# Patient Record
Sex: FEMALE | Race: BLACK OR AFRICAN AMERICAN | NOT HISPANIC OR LATINO | Employment: OTHER | URBAN - METROPOLITAN AREA
[De-identification: names, ages, dates, MRNs, and addresses within clinical notes are randomized per-mention and may not be internally consistent; named-entity substitution may affect disease eponyms.]

---

## 2017-01-17 ENCOUNTER — OFFICE VISIT (OUTPATIENT)
Dept: OBSTETRICS AND GYNECOLOGY | Facility: CLINIC | Age: 57
End: 2017-01-17
Payer: MEDICARE

## 2017-01-17 VITALS
DIASTOLIC BLOOD PRESSURE: 86 MMHG | SYSTOLIC BLOOD PRESSURE: 144 MMHG | BODY MASS INDEX: 32.31 KG/M2 | WEIGHT: 200.19 LBS

## 2017-01-17 DIAGNOSIS — N95.2 VAGINAL ATROPHY: Primary | ICD-10-CM

## 2017-01-17 PROCEDURE — 99213 OFFICE O/P EST LOW 20 MIN: CPT | Mod: S$GLB,,, | Performed by: OBSTETRICS & GYNECOLOGY

## 2017-01-17 PROCEDURE — 99999 PR PBB SHADOW E&M-EST. PATIENT-LVL II: CPT | Mod: PBBFAC,,, | Performed by: OBSTETRICS & GYNECOLOGY

## 2017-01-17 PROCEDURE — 87210 SMEAR WET MOUNT SALINE/INK: CPT | Mod: QW,S$GLB,, | Performed by: OBSTETRICS & GYNECOLOGY

## 2017-01-17 PROCEDURE — 1159F MED LIST DOCD IN RCRD: CPT | Mod: S$GLB,,, | Performed by: OBSTETRICS & GYNECOLOGY

## 2017-01-17 RX ORDER — ISOSORBIDE MONONITRATE 30 MG/1
30 TABLET, EXTENDED RELEASE ORAL
Refills: 11 | COMMUNITY
Start: 2017-01-10 | End: 2017-08-17

## 2017-01-17 RX ORDER — DICLOFENAC SODIUM 10 MG/G
1 GEL TOPICAL CONTINUOUS PRN
Refills: 2 | COMMUNITY
Start: 2016-12-30 | End: 2018-11-07

## 2017-01-17 RX ORDER — ESTRADIOL 0.1 MG/G
CREAM VAGINAL
Qty: 42.5 G | Refills: 2 | Status: SHIPPED | OUTPATIENT
Start: 2017-01-17 | End: 2017-04-24 | Stop reason: SDUPTHER

## 2017-01-17 RX ORDER — PANTOPRAZOLE SODIUM 20 MG/1
20 TABLET, DELAYED RELEASE ORAL DAILY PRN
Refills: 3 | COMMUNITY
Start: 2016-12-30 | End: 2018-12-28

## 2017-01-17 RX ORDER — TIZANIDINE HYDROCHLORIDE 4 MG/1
1 CAPSULE, GELATIN COATED ORAL 2 TIMES DAILY
Refills: 0 | COMMUNITY
Start: 2016-12-30 | End: 2017-08-17

## 2017-01-17 NOTE — MR AVS SNAPSHOT
[facility] - OB/ GYN  [address]  [place] [address]  Phone: [phone]  Fax: [phone]                  Ju Sinclair   2017 4:00 PM   Office Visit    Description:  Female : 1960   Provider:  Goran Real MD   Department:  [facility] - OB/ GYN           Reason for Visit     Vaginitis           Diagnoses this Visit        Comments    Vaginal atrophy    -  Primary            To Do List           Goals (5 Years of Data)     None      Follow-Up and Disposition     Return if symptoms worsen or fail to improve.    Follow-up and Disposition History       These Medications        Disp Refills Start End    estradiol (ESTRACE) 0.01 % (0.1 mg/gram) vaginal cream 42.5 g 2 2017     INSERT 1 GRAM VAGINALLY EVERY OTHER DAY FOR 1 MONTH THEN TWICE WEEKLY X 30 DAYS, THEN ONCE OR TWICE WEEKLY AS NEEDED    Pharmacy: [facility]/pharmacy #[id number] - [place], [place] - [address] Ph #: [phone]         [facility] On Call     [facility] On Call Nurse [facility] -  Assistance  Registered nurses in the [facility] provide clinical advisement, health education, appointment booking, and other advisory services.  Call for this free service at [phone].             Medications           Message regarding Medications     Verify the changes and/or additions to your medication regime listed below are the same as discussed with your clinician today.  If any of these changes or additions are incorrect, please notify your healthcare provider.        CHANGE how you are taking these medications     Start Taking Instead of    estradiol (ESTRACE) 0.01 % (0.1 mg/gram) vaginal cream ESTRACE 0.01 % (0.1 mg/gram) vaginal cream    Dosage:  INSERT 1 GRAM VAGINALLY EVERY OTHER DAY FOR 1 MONTH THEN TWICE WEEKLY X 30 DAYS, THEN ONCE OR TWICE WEEKLY AS NEEDED Dosage:  INSERT 1 GRAM VAGINALLY EVERY OTHER DAY FOR 1 MONTH THEN TWICE WEEKLY X 30 DAYS, THEN ONCE OR TWICE WEEKLY AS NEEDED    Reason for Change:  Patient  no longer taking       STOP taking these medications     NEXIUM 20 mg capsule Take 20 mg by mouth once daily. FOR ACID REFLUX    clonazePAM (KLONOPIN) 0.5 MG tablet Take 0.5 mg by mouth nightly.           Verify that the below list of medications is an accurate representation of the medications you are currently taking.  If none reported, the list may be blank. If incorrect, please contact your healthcare provider. Carry this list with you in case of emergency.           Current Medications     amlodipine-valsartan-hcthiazid -25 mg Tab once daily.     diclofenac sodium 1 % Gel 1 g continuous prn.    hydrocodone-acetaminophen 10-325mg (NORCO)  mg Tab     isosorbide mononitrate (IMDUR) 30 MG 24 hr tablet Take 30 mg by mouth every 24 hours as needed.    pantoprazole (PROTONIX) 20 MG tablet Take 20 mg by mouth daily as needed.    tizanidine 4 mg Cap Take 1 capsule by mouth 2 (two) times daily.    zolpidem (AMBIEN) 10 mg Tab Take 10 mg by mouth every evening.    amitriptyline (ELAVIL) 10 MG tablet Take 10 mg by mouth.    estradiol (ESTRACE) 0.01 % (0.1 mg/gram) vaginal cream INSERT 1 GRAM VAGINALLY EVERY OTHER DAY FOR 1 MONTH THEN TWICE WEEKLY X 30 DAYS, THEN ONCE OR TWICE WEEKLY AS NEEDED           Clinical Reference Information           Vital Signs - Last Recorded  Most recent update: 1/17/2017  4:54 PM by Zeina Ontiveros LPN    BP Wt BMI          (!) 144/86 90.8 kg (200 lb 2.8 oz) 32.31 kg/m2        Blood Pressure          Most Recent Value    BP  (!)  144/86      Allergies as of 1/17/2017     Pcn [Penicillins]    Mobic [Meloxicam]    Tramadol      Immunizations Administered on Date of Encounter - 1/17/2017     None      Orders Placed During Today's Visit      Normal Orders This Visit    POCT Wet Prep

## 2017-01-17 NOTE — PROGRESS NOTES
Subjective:       Patient ID: Ju Sinclair is a 56 y.o. female.    Chief Complaint:  Vaginitis      History of Present Illness  HPI  Pt complains of persistent vaginal burning and irritation.  Pt was previously prescribed Estrace cream for this issue and reports that her symptoms improved.  However, pt ran out of medication and symptoms recurred.  Pt requests refill of this medication.  Last pap NILM in 2016.    GYN & OB History  No LMP recorded. Patient is postmenopausal.   Date of Last Pap: 2016    OB History    Para Term  AB SAB TAB Ectopic Multiple Living   2 2 2             # Outcome Date GA Lbr Vinicio/2nd Weight Sex Delivery Anes PTL Lv   2 Term      CS-Unspec      1 Term      CS-Unspec             Review of Systems  Review of Systems   Constitutional: Negative for activity change, appetite change, fatigue, fever and unexpected weight change.   Respiratory: Negative for cough, shortness of breath and wheezing.    Cardiovascular: Negative for chest pain.   Gastrointestinal: Negative for abdominal pain, constipation, diarrhea, nausea and vomiting.   Genitourinary: Positive for vaginal pain. Negative for dysuria, genital sores, hematuria, pelvic pain, vaginal bleeding, vaginal discharge and vaginal odor.   Musculoskeletal: Negative for back pain.   Neurological: Negative for syncope and headaches.           Objective:    Physical Exam:   Constitutional: She is oriented to person, place, and time. She appears well-developed and well-nourished. No distress.       Cardiovascular: Normal rate and regular rhythm.     Pulmonary/Chest: Effort normal.        Abdominal: Soft. Bowel sounds are normal. She exhibits no distension. There is no tenderness.     Genitourinary: Vagina normal and uterus normal. Pelvic exam was performed with patient supine. There is no rash, tenderness, lesion or injury on the right labia. There is no rash, tenderness, lesion or injury on the left labia. Uterus is not  deviated, not enlarged and not tender. Cervix is normal. Right adnexum displays no mass, no tenderness and no fullness. Left adnexum displays no mass, no tenderness and no fullness. No erythema, tenderness or bleeding in the vagina. No foreign body in the vagina. No signs of injury around the vagina. No vaginal discharge found. Cervix exhibits no motion tenderness, no discharge and no friability.   Genitourinary Comments: Pale, dry mucosa with absent ruggae consistent with vaginal atrophy  Wet prep: negative for clue cells, yeast, or trichomonas           Musculoskeletal: Normal range of motion and moves all extremeties. She exhibits no edema or tenderness.       Neurological: She is alert and oriented to person, place, and time.    Skin: Skin is warm and dry.    Psychiatric: She has a normal mood and affect. Her behavior is normal. Thought content normal.          Assessment:        1. Vaginal atrophy             Plan:      Vaginal atrophy  -     estradiol (ESTRACE) 0.01 % (0.1 mg/gram) vaginal cream; INSERT 1 GRAM VAGINALLY EVERY OTHER DAY FOR 1 MONTH THEN TWICE WEEKLY X 30 DAYS, THEN ONCE OR TWICE WEEKLY  AS NEEDED  Dispense: 42.5 g; Refill: 2  -     POCT Wet Prep  -     No evidence of infection and evidence of vaginal atrophy on exam today.  OK to refill estrogen cream and otc lubricant use prn.  Pt voiced understanding.       Return if symptoms worsen or fail to improve.

## 2017-04-13 ENCOUNTER — TELEPHONE (OUTPATIENT)
Dept: OBSTETRICS AND GYNECOLOGY | Facility: CLINIC | Age: 57
End: 2017-04-13

## 2017-04-13 DIAGNOSIS — Z12.31 ENCOUNTER FOR SCREENING MAMMOGRAM FOR BREAST CANCER: Primary | ICD-10-CM

## 2017-04-13 NOTE — TELEPHONE ENCOUNTER
----- Message from Belkys Frey MA sent at 4/13/2017  1:56 PM CDT -----      ----- Message -----     From: Ana Gallegos     Sent: 4/13/2017   1:40 PM       To: Perfecto FERRIS Staff    pls input 6mth mammo orders to [facility] 4/24..[phone]

## 2017-04-24 ENCOUNTER — OFFICE VISIT (OUTPATIENT)
Dept: OBSTETRICS AND GYNECOLOGY | Facility: CLINIC | Age: 57
End: 2017-04-24
Payer: MEDICARE

## 2017-04-24 ENCOUNTER — HOSPITAL ENCOUNTER (OUTPATIENT)
Dept: RADIOLOGY | Facility: HOSPITAL | Age: 57
Discharge: HOME | End: 2017-04-24
Attending: NURSE PRACTITIONER
Payer: MEDICARE

## 2017-04-24 VITALS
HEIGHT: 66 IN | SYSTOLIC BLOOD PRESSURE: 124 MMHG | BODY MASS INDEX: 32.47 KG/M2 | WEIGHT: 202 LBS | DIASTOLIC BLOOD PRESSURE: 70 MMHG

## 2017-04-24 DIAGNOSIS — Z12.31 ENCOUNTER FOR SCREENING MAMMOGRAM FOR BREAST CANCER: ICD-10-CM

## 2017-04-24 DIAGNOSIS — B96.89 BV (BACTERIAL VAGINOSIS): ICD-10-CM

## 2017-04-24 DIAGNOSIS — N76.0 BV (BACTERIAL VAGINOSIS): ICD-10-CM

## 2017-04-24 DIAGNOSIS — Z11.3 SCREENING FOR STD (SEXUALLY TRANSMITTED DISEASE): ICD-10-CM

## 2017-04-24 DIAGNOSIS — N89.8 VAGINAL ODOR: Primary | ICD-10-CM

## 2017-04-24 DIAGNOSIS — N95.2 VAGINAL ATROPHY: ICD-10-CM

## 2017-04-24 PROCEDURE — 99214 OFFICE O/P EST MOD 30 MIN: CPT | Mod: S$GLB,,, | Performed by: NURSE PRACTITIONER

## 2017-04-24 PROCEDURE — 77063 BREAST TOMOSYNTHESIS BI: CPT | Mod: 26,,, | Performed by: RADIOLOGY

## 2017-04-24 PROCEDURE — 77067 SCR MAMMO BI INCL CAD: CPT | Mod: 26,,, | Performed by: RADIOLOGY

## 2017-04-24 PROCEDURE — 99999 PR PBB SHADOW E&M-EST. PATIENT-LVL III: CPT | Mod: PBBFAC,,, | Performed by: NURSE PRACTITIONER

## 2017-04-24 PROCEDURE — 1160F RVW MEDS BY RX/DR IN RCRD: CPT | Mod: S$GLB,,, | Performed by: NURSE PRACTITIONER

## 2017-04-24 PROCEDURE — 77067 SCR MAMMO BI INCL CAD: CPT | Mod: TC

## 2017-04-24 RX ORDER — CELECOXIB 100 MG/1
CAPSULE ORAL
Refills: 1 | COMMUNITY
Start: 2017-04-04 | End: 2018-12-28

## 2017-04-24 RX ORDER — METOPROLOL SUCCINATE 25 MG/1
25 TABLET, EXTENDED RELEASE ORAL DAILY
Refills: 11 | COMMUNITY
Start: 2017-04-12 | End: 2019-03-19 | Stop reason: SDUPTHER

## 2017-04-24 RX ORDER — NITROGLYCERIN 0.3 MG/1
TABLET SUBLINGUAL
Refills: 11 | COMMUNITY
Start: 2017-04-14 | End: 2017-08-17

## 2017-04-24 RX ORDER — ESTRADIOL 0.1 MG/G
CREAM VAGINAL
Qty: 42.5 G | Refills: 2 | Status: SHIPPED | OUTPATIENT
Start: 2017-04-24 | End: 2018-11-07

## 2017-04-24 RX ORDER — METRONIDAZOLE 500 MG/1
500 TABLET ORAL EVERY 12 HOURS
Qty: 14 TABLET | Refills: 0 | Status: SHIPPED | OUTPATIENT
Start: 2017-04-24 | End: 2017-05-01

## 2017-04-24 NOTE — PROGRESS NOTES
"Ju Sinclair is a 57 y.o. female  presents with complaint of vaginal discharge for few weeks.  She denies itching.  reports odor.  She states the discharge is yellow.    Wants blood work std testing done.   Wants refill on her estrace.     Past Medical History:   Diagnosis Date    Anxiety disorder     Breast disorder     Hypertension     Mental disorder     Neuropathy     Sleep apnea      Past Surgical History:   Procedure Laterality Date     SECTION      SHOULDER SURGERY      TONSILLECTOMY       Social History   Substance Use Topics    Smoking status: Never Smoker    Smokeless tobacco: Never Used    Alcohol use No     Family History   Problem Relation Age of Onset    Eclampsia Paternal Grandmother     Diabetes Paternal Grandmother     Breast cancer Sister     Colon cancer Maternal Grandfather     Ovarian cancer Neg Hx     Cancer Neg Hx     Hypertension Neg Hx     Miscarriages / Stillbirths Neg Hx      labor Neg Hx     Stroke Neg Hx      OB History    Para Term  AB SAB TAB Ectopic Multiple Living   2 2 2       2      # Outcome Date GA Lbr Vinicio/2nd Weight Sex Delivery Anes PTL Lv   2 Term      CS-Unspec      1 Term      CS-Unspec             /70  Ht 5' 6" (1.676 m)  Wt 91.6 kg (202 lb)  BMI 32.6 kg/m2    ROS:  GENERAL: No fever, chills, fatigability or weight loss.  VULVAR: No pain, no lesions and no itching.  VAGINAL: No relaxation, no itching, no discharge, no abnormal bleeding and no lesions.  ABDOMEN: No abdominal pain. Denies nausea. Denies vomiting. No diarrhea. No constipation  BREAST: Denies pain. No lumps. No discharge.  URINARY: No incontinence, no nocturia, no frequency and no dysuria.  CARDIOVASCULAR: No chest pain. No shortness of breath. No leg cramps.  NEUROLOGICAL: No headaches. No vision changes.    PHYSICAL EXAM:  VULVA: normal appearing vulva with no masses, tenderness or lesions, VAGINA: atrophic, vaginal discharge - creamy, " CERVIX: normal appearing cervix without discharge or lesions, UTERUS: uterus is normal size, shape, consistency and nontender, ADNEXA: normal adnexa in size, nontender and no masses, WET MOUNT done - results: clue cells, excessive bacteria    ASSESSMENT and PLAN:  1. Vaginal odor  POCT Wet Prep   2. BV (bacterial vaginosis)  metronidazole (FLAGYL) 500 MG tablet   3. Screening for STD (sexually transmitted disease)  HIV-1 and HIV-2 antibodies    Hepatitis panel, acute    RPR   4. Vaginal atrophy  estradiol (ESTRACE) 0.01 % (0.1 mg/gram) vaginal cream       Patient was counseled today on vaginitis prevention including :  a. avoiding feminine products such as deoderant soaps, body wash, bubble bath, douches, scented toilet paper, deoderant tampons or pads, feminine wipes, chronic pad use, etc.  b. avoiding other vulvovaginal irritants such as long hot baths, humidity, tight, synthetic clothing, chlorine and sitting around in wet bathing suits  c. wearing cotton underwear, avoiding thong underwear and no underwear to bed  d. taking showers instead of baths and use a hair dryer on cool setting afterwards to dry  e. wearing cotton to exercise and shower immediately after exercise and change clothes  f. using polyurethane condoms without spermicide if sexually active and symptoms are triggered by intercourse

## 2017-07-20 ENCOUNTER — TELEPHONE (OUTPATIENT)
Dept: UROLOGY | Facility: CLINIC | Age: 57
End: 2017-07-20

## 2017-07-20 NOTE — TELEPHONE ENCOUNTER
----- Message from Belkys Boothe sent at 7/20/2017  9:45 AM CDT -----  NP- Requesting an appt on today for bladder issues. Patient was advised of availability. Please adv/call [phone].//thanks. cw

## 2017-08-17 ENCOUNTER — OFFICE VISIT (OUTPATIENT)
Dept: INTERNAL MEDICINE | Facility: CLINIC | Age: 57
End: 2017-08-17
Payer: MEDICARE

## 2017-08-17 ENCOUNTER — HOSPITAL ENCOUNTER (OUTPATIENT)
Dept: RADIOLOGY | Facility: HOSPITAL | Age: 57
Discharge: HOME | End: 2017-08-17
Attending: NURSE PRACTITIONER
Payer: MEDICARE

## 2017-08-17 VITALS
HEART RATE: 101 BPM | DIASTOLIC BLOOD PRESSURE: 82 MMHG | BODY MASS INDEX: 32.88 KG/M2 | WEIGHT: 204.56 LBS | SYSTOLIC BLOOD PRESSURE: 124 MMHG | TEMPERATURE: 97 F | HEIGHT: 66 IN

## 2017-08-17 DIAGNOSIS — M54.2 NECK PAIN: ICD-10-CM
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
## 2017-08-17 DIAGNOSIS — M25.552 PAIN OF LEFT HIP JOINT: ICD-10-CM

## 2017-08-17 DIAGNOSIS — M54.50 ACUTE BILATERAL LOW BACK PAIN WITHOUT SCIATICA: ICD-10-CM
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
## 2017-08-17 PROCEDURE — 71020 XR CHEST PA AND LATERAL: CPT | Mod: 26,,, | Performed by: RADIOLOGY

## 2017-08-17 PROCEDURE — 96372 THER/PROPH/DIAG INJ SC/IM: CPT | Mod: S$GLB,,, | Performed by: NURSE PRACTITIONER

## 2017-08-17 PROCEDURE — 99999 PR PBB SHADOW E&M-EST. PATIENT-LVL V: CPT | Mod: PBBFAC,,, | Performed by: NURSE PRACTITIONER

## 2017-08-17 PROCEDURE — 73521 X-RAY EXAM HIPS BI 2 VIEWS: CPT | Mod: TC,PO

## 2017-08-17 PROCEDURE — 72100 X-RAY EXAM L-S SPINE 2/3 VWS: CPT | Mod: TC,PO

## 2017-08-17 PROCEDURE — 99214 OFFICE O/P EST MOD 30 MIN: CPT | Mod: 25,S$GLB,, | Performed by: NURSE PRACTITIONER

## 2017-08-17 PROCEDURE — 72040 X-RAY EXAM NECK SPINE 2-3 VW: CPT | Mod: TC,PO

## 2017-08-17 PROCEDURE — 72040 X-RAY EXAM NECK SPINE 2-3 VW: CPT | Mod: 26,,, | Performed by: RADIOLOGY

## 2017-08-17 PROCEDURE — 71020 XR CHEST PA AND LATERAL: CPT | Mod: TC,PO

## 2017-08-17 PROCEDURE — 72100 X-RAY EXAM L-S SPINE 2/3 VWS: CPT | Mod: 26,,, | Performed by: RADIOLOGY

## 2017-08-17 PROCEDURE — 73521 X-RAY EXAM HIPS BI 2 VIEWS: CPT | Mod: 26,,, | Performed by: RADIOLOGY

## 2017-08-17 RX ORDER — NAPROXEN 500 MG/1
500 TABLET ORAL 2 TIMES DAILY WITH MEALS
Qty: 14 TABLET | Refills: 0 | Status: SHIPPED | OUTPATIENT
Start: 2017-08-17 | End: 2017-08-24

## 2017-08-17 RX ORDER — TIZANIDINE 2 MG/1
2 TABLET ORAL EVERY 6 HOURS PRN
Qty: 10 TABLET | Refills: 0 | Status: SHIPPED | OUTPATIENT
Start: 2017-08-17 | End: 2017-08-27

## 2017-08-17 RX ORDER — KETOROLAC TROMETHAMINE 30 MG/ML
30 INJECTION, SOLUTION INTRAMUSCULAR; INTRAVENOUS
Status: COMPLETED | OUTPATIENT
Start: 2017-08-17 | End: 2017-08-17

## 2017-08-17 RX ADMIN — KETOROLAC TROMETHAMINE 30 MG: 30 INJECTION, SOLUTION INTRAMUSCULAR; INTRAVENOUS at 01:08

## 2017-08-17 NOTE — PROGRESS NOTES
Subjective:       Patient ID: Ju Sinclair is a 57 y.o. female.    Chief Complaint: Fall (fell last night and today has total body ache and pain)    Fall   The accident occurred 6 to 12 hours ago. Fall occurred: while getting prayed for at [employer]. She fell from a height of 6 to 10 ft (she fell straight backward from standing height). She landed on concrete. There was no blood loss. The point of impact was the buttocks, left hip and neck. The pain is present in the buttocks, back, left hip and neck. The pain is at a severity of 8/10. The pain is moderate. The symptoms are aggravated by ambulation, movement, pressure on injury and rotation. Associated symptoms include headaches (mild ). Pertinent negatives include no abdominal pain, bowel incontinence, fever, hearing loss, hematuria, loss of consciousness, nausea, numbness, tingling, visual change or vomiting. She has tried nothing for the symptoms. The treatment provided no relief.     Review of Systems   Constitutional: Negative for activity change, appetite change, chills, diaphoresis, fatigue, fever and unexpected weight change.   Gastrointestinal: Negative for abdominal pain, bowel incontinence, nausea and vomiting.   Genitourinary: Negative for hematuria.   Musculoskeletal: Positive for arthralgias and myalgias.   Neurological: Positive for headaches (mild ). Negative for tingling, loss of consciousness and numbness.       Objective:      Physical Exam   Constitutional: She is oriented to person, place, and time.   Cardiovascular: Normal rate and regular rhythm.    Pulmonary/Chest: Effort normal and breath sounds normal.   Abdominal: Soft. Bowel sounds are normal.   Musculoskeletal:        Left hip: She exhibits decreased range of motion, decreased strength, tenderness and bony tenderness. She exhibits no swelling, no crepitus, no deformity and no laceration.        Thoracic back: She exhibits spasm.        Lumbar back: She exhibits decreased range of  motion, tenderness, pain and spasm.   Neurological: She is alert and oriented to person, place, and time.   Skin: Skin is warm and dry.   Psychiatric: She has a normal mood and affect.       Assessment:       1. Fall, initial encounter    2. Neck pain    3. Pain of left hip joint    4. Acute bilateral low back pain without sciatica        Plan:   Fall, initial encounter  -     X-Ray Lumbar Spine Ap And Lateral; Future; Expected date: 08/17/2017  -     X-Ray Hips Bilateral 2 View Inc AP Pelvis; Future; Expected date: 08/17/2017  -     X-Ray Cervical Spine AP And Lateral; Future; Expected date: 08/17/2017  -     X-Ray Chest PA And Lateral; Future; Expected date: 08/17/2017  -     tizanidine (ZANAFLEX) 2 MG tablet; Take 1 tablet (2 mg total) by mouth every 6 (six) hours as needed.  Dispense: 10 tablet; Refill: 0  -     ketorolac injection 30 mg; Inject 30 mg into the muscle one time.  -     naproxen (NAPROSYN) 500 MG tablet; Take 1 tablet (500 mg total) by mouth 2 (two) times daily with meals.  Dispense: 14 tablet; Refill: 0    Neck pain  -     X-Ray Cervical Spine AP And Lateral; Future; Expected date: 08/17/2017  -     tizanidine (ZANAFLEX) 2 MG tablet; Take 1 tablet (2 mg total) by mouth every 6 (six) hours as needed.  Dispense: 10 tablet; Refill: 0  -     ketorolac injection 30 mg; Inject 30 mg into the muscle one time.  -     naproxen (NAPROSYN) 500 MG tablet; Take 1 tablet (500 mg total) by mouth 2 (two) times daily with meals.  Dispense: 14 tablet; Refill: 0    Pain of left hip joint  -     X-Ray Hips Bilateral 2 View Inc AP Pelvis; Future; Expected date: 08/17/2017  -     tizanidine (ZANAFLEX) 2 MG tablet; Take 1 tablet (2 mg total) by mouth every 6 (six) hours as needed.  Dispense: 10 tablet; Refill: 0  -     ketorolac injection 30 mg; Inject 30 mg into the muscle one time.  -     naproxen (NAPROSYN) 500 MG tablet; Take 1 tablet (500 mg total) by mouth 2 (two) times daily with meals.  Dispense: 14 tablet;  Refill: 0    Acute bilateral low back pain without sciatica  -     X-Ray Lumbar Spine Ap And Lateral; Future; Expected date: 08/17/2017  -     tizanidine (ZANAFLEX) 2 MG tablet; Take 1 tablet (2 mg total) by mouth every 6 (six) hours as needed.  Dispense: 10 tablet; Refill: 0  -     ketorolac injection 30 mg; Inject 30 mg into the muscle one time.  -     naproxen (NAPROSYN) 500 MG tablet; Take 1 tablet (500 mg total) by mouth 2 (two) times daily with meals.  Dispense: 14 tablet; Refill: 0      As above  xrays with review to follow  meds as above   bengay massage/ diclofenac gel  Warm epsom salt bath  Gentle stretching  Heating pad   ER for acute changes/worsening of symptoms or new onset of acute symptoms- (confusion, cp, sob, blurred vision, slurred speech)  D/c celebrex for now

## 2018-04-13 ENCOUNTER — TELEPHONE (OUTPATIENT)
Dept: OBSTETRICS AND GYNECOLOGY | Facility: CLINIC | Age: 58
End: 2018-04-13

## 2018-04-13 DIAGNOSIS — Z12.31 ENCOUNTER FOR SCREENING MAMMOGRAM FOR BREAST CANCER: Primary | ICD-10-CM

## 2018-04-13 NOTE — TELEPHONE ENCOUNTER
----- Message from Yris Torres sent at 4/13/2018  9:59 AM CDT -----  Contact: Pt  Pt called and stated she needed to speak to the nurse. She stated she needs an order for a mammo. She can be reached at [phone].    Thanks,  TF

## 2018-04-13 NOTE — TELEPHONE ENCOUNTER
----- Message from Yris Torres sent at 4/13/2018  9:59 AM CDT -----  Contact: Pt  Pt called and stated she needed to speak to the nurse. She stated she needs an order for a mammo. She can be reached at [phone].    Thanks,  TF

## 2018-04-13 NOTE — TELEPHONE ENCOUNTER
----- Message from Yris Torres sent at 4/13/2018  9:59 AM CDT -----  Contact: Pt  Pt called and stated she needed to speak to the nurse. She stated she needs an order for a mammo. She can be reached at [phone].    Thanks,  TF

## 2018-04-23 ENCOUNTER — OFFICE VISIT (OUTPATIENT)
Dept: INTERNAL MEDICINE | Facility: CLINIC | Age: 58
End: 2018-04-23
Payer: MEDICARE

## 2018-04-23 ENCOUNTER — HOSPITAL ENCOUNTER (OUTPATIENT)
Dept: RADIOLOGY | Facility: HOSPITAL | Age: 58
Discharge: HOME | End: 2018-04-23
Attending: NURSE PRACTITIONER
Payer: MEDICARE

## 2018-04-23 VITALS
TEMPERATURE: 98 F | DIASTOLIC BLOOD PRESSURE: 80 MMHG | WEIGHT: 222.88 LBS | SYSTOLIC BLOOD PRESSURE: 122 MMHG | BODY MASS INDEX: 35.82 KG/M2 | HEART RATE: 90 BPM | HEIGHT: 66 IN | OXYGEN SATURATION: 97 %

## 2018-04-23 DIAGNOSIS — J06.9 ACUTE URI: ICD-10-CM

## 2018-04-23 DIAGNOSIS — R22.9 SINGLE SKIN NODULE: ICD-10-CM

## 2018-04-23 DIAGNOSIS — J06.9 ACUTE URI: Primary | ICD-10-CM

## 2018-04-23 PROCEDURE — 99214 OFFICE O/P EST MOD 30 MIN: CPT | Mod: S$GLB,,, | Performed by: NURSE PRACTITIONER

## 2018-04-23 PROCEDURE — 71046 X-RAY EXAM CHEST 2 VIEWS: CPT | Mod: TC,FY,PO

## 2018-04-23 PROCEDURE — 71046 X-RAY EXAM CHEST 2 VIEWS: CPT | Mod: 26,,, | Performed by: RADIOLOGY

## 2018-04-23 PROCEDURE — 99999 PR PBB SHADOW E&M-EST. PATIENT-LVL IV: CPT | Mod: PBBFAC,,, | Performed by: NURSE PRACTITIONER

## 2018-04-23 RX ORDER — GUAIFENESIN 600 MG/1
600 TABLET, EXTENDED RELEASE ORAL 2 TIMES DAILY
Qty: 20 TABLET | Refills: 0 | Status: SHIPPED | OUTPATIENT
Start: 2018-04-23 | End: 2018-05-03

## 2018-04-23 RX ORDER — FLUTICASONE PROPIONATE 50 MCG
2 SPRAY, SUSPENSION (ML) NASAL DAILY
Qty: 16 G | Refills: 0 | Status: SHIPPED | OUTPATIENT
Start: 2018-04-23 | End: 2018-12-28

## 2018-04-23 RX ORDER — LOSARTAN POTASSIUM AND HYDROCHLOROTHIAZIDE 25; 100 MG/1; MG/1
TABLET ORAL
Refills: 0 | COMMUNITY
Start: 2018-03-27 | End: 2018-12-28

## 2018-04-23 RX ORDER — METHYLPREDNISOLONE 4 MG/1
TABLET ORAL
Qty: 1 PACKAGE | Refills: 0 | Status: SHIPPED | OUTPATIENT
Start: 2018-04-23 | End: 2018-12-28

## 2018-04-23 RX ORDER — VENLAFAXINE HYDROCHLORIDE 150 MG/1
CAPSULE, EXTENDED RELEASE ORAL
Refills: 1 | COMMUNITY
Start: 2018-03-27 | End: 2019-03-19

## 2018-04-23 RX ORDER — AMLODIPINE BESYLATE 5 MG/1
5 TABLET ORAL DAILY
Refills: 0 | COMMUNITY
Start: 2018-03-27 | End: 2018-11-07

## 2018-04-23 NOTE — PROGRESS NOTES
Subjective:       Patient ID: Ju Sinclair is a 58 y.o. female.    Chief Complaint: Chest Congestion; Nasal Congestion; Cough; and Mass (bump on rt thigh and painful to touch, noticed few days ago)    URI    This is a new problem. The current episode started in the past 7 days. The problem has been waxing and waning. There has been no fever. Associated symptoms include congestion, coughing, rhinorrhea and wheezing. Pertinent negatives include no abdominal pain, chest pain, diarrhea, dysuria, ear pain, headaches, joint pain, joint swelling, nausea, neck pain, plugged ear sensation, rash, sinus pain, sneezing, sore throat, swollen glands or vomiting. She has tried antihistamine for the symptoms. The treatment provided mild relief.     Review of Systems   Constitutional: Negative for activity change, appetite change, chills, diaphoresis, fatigue, fever and unexpected weight change.   HENT: Positive for congestion, postnasal drip and rhinorrhea. Negative for ear pain, sinus pain, sinus pressure, sneezing, sore throat, tinnitus, trouble swallowing and voice change.    Eyes: Negative for photophobia, pain and visual disturbance.   Respiratory: Positive for cough and wheezing. Negative for chest tightness and shortness of breath.    Cardiovascular: Negative for chest pain, palpitations and leg swelling.   Gastrointestinal: Negative for abdominal distention, abdominal pain, blood in stool, constipation, diarrhea, nausea and vomiting.   Genitourinary: Negative for dysuria and frequency.   Musculoskeletal: Negative for arthralgias, back pain, joint pain, joint swelling, neck pain and neck stiffness.   Skin: Negative for rash.        Small knot to right thigh   Neurological: Negative for dizziness, tremors, seizures, syncope, facial asymmetry, speech difficulty, weakness, light-headedness, numbness and headaches.   Psychiatric/Behavioral: Negative for confusion and sleep disturbance.       Objective:      Physical Exam    HENT:   Right Ear: Tympanic membrane normal.   Left Ear: Tympanic membrane normal.   Nose: Mucosal edema present.   Mouth/Throat: Uvula is midline, oropharynx is clear and moist and mucous membranes are normal.   Neck: Normal range of motion. Neck supple.   Cardiovascular: Normal rate and regular rhythm.    Pulmonary/Chest: Effort normal. She has wheezes (cleared with cough ).   Musculoskeletal: Normal range of motion.   Neurological: She is alert.   Skin: Skin is warm and dry.        Psychiatric: She has a normal mood and affect.       Assessment:       1. Acute URI    2. Single skin nodule        Plan:   Acute URI  -     X-Ray Chest PA And Lateral; Future; Expected date: 04/23/2018  -     methylPREDNISolone (MEDROL DOSEPACK) 4 mg tablet; use as directed  Dispense: 1 Package; Refill: 0  -     fluticasone (FLONASE) 50 mcg/actuation nasal spray; 2 sprays (100 mcg total) by Each Nare route once daily.  Dispense: 16 g; Refill: 0    Single skin nodule  Comments:  right lateral upper thigh     Other orders  -     guaiFENesin (MUCINEX) 600 mg 12 hr tablet; Take 1 tablet (600 mg total) by mouth 2 (two) times daily.  Dispense: 20 tablet; Refill: 0      As above  Increase water intake  Apply warm compress to right upper thigh 15-20 mins twice daily  Start steroid pack today - call if symptoms fail to improve by Friday

## 2018-04-23 NOTE — MEDICAL/APP STUDENT
Subjective:       Patient ID: Ju Sinclair is a 58 y.o. female.    Chief Complaint: Chest Congestion; Nasal Congestion; Cough; and Mass (bump on rt thigh and painful to touch, noticed few days ago)    Pt comes in complaining of cold for over a week. She states she has noticed some wheezing and that the symptoms have not gotten any better. Pt states she is coughing up yellow- greenish mucous. Denies fever, SOB, vomiting, or diarrhea. Pt also complains of a knot to her R thigh that she noticed a few days ago.       Review of Systems    Objective:      Physical Exam    Assessment:       No diagnosis found.    Plan:

## 2018-04-23 NOTE — PATIENT INSTRUCTIONS
Apply warm compress to right upper thigh 15-20 mins twice daily  Start steroid pack today - call if symptoms fail to improve by Friday

## 2018-05-04 ENCOUNTER — HOSPITAL ENCOUNTER (OUTPATIENT)
Dept: RADIOLOGY | Facility: HOSPITAL | Age: 58
Discharge: HOME | End: 2018-05-04
Attending: NURSE PRACTITIONER
Payer: MEDICARE

## 2018-05-04 ENCOUNTER — OFFICE VISIT (OUTPATIENT)
Dept: OBSTETRICS AND GYNECOLOGY | Facility: CLINIC | Age: 58
End: 2018-05-04
Payer: MEDICARE

## 2018-05-04 VITALS
HEIGHT: 66 IN | DIASTOLIC BLOOD PRESSURE: 82 MMHG | WEIGHT: 222 LBS | BODY MASS INDEX: 35.68 KG/M2 | SYSTOLIC BLOOD PRESSURE: 130 MMHG

## 2018-05-04 DIAGNOSIS — R10.2 PELVIC PAIN IN FEMALE: ICD-10-CM

## 2018-05-04 DIAGNOSIS — R31.9 URINARY TRACT INFECTION WITH HEMATURIA, SITE UNSPECIFIED: ICD-10-CM

## 2018-05-04 DIAGNOSIS — Z12.31 ENCOUNTER FOR SCREENING MAMMOGRAM FOR BREAST CANCER: ICD-10-CM

## 2018-05-04 DIAGNOSIS — N39.0 URINARY TRACT INFECTION WITH HEMATURIA, SITE UNSPECIFIED: ICD-10-CM

## 2018-05-04 DIAGNOSIS — Z01.419 ENCOUNTER FOR GYNECOLOGICAL EXAMINATION WITHOUT ABNORMAL FINDING: Primary | ICD-10-CM

## 2018-05-04 DIAGNOSIS — R35.0 URINE FREQUENCY: ICD-10-CM

## 2018-05-04 DIAGNOSIS — N85.2 ENLARGED UTERUS: ICD-10-CM

## 2018-05-04 LAB
BILIRUB UR QL STRIP: NEGATIVE
CLARITY UR: CLEAR
COLOR UR: YELLOW
GLUCOSE UR QL STRIP: NEGATIVE
HGB UR QL STRIP: ABNORMAL
KETONES UR QL STRIP: ABNORMAL
LEUKOCYTE ESTERASE UR QL STRIP: NEGATIVE
MICROSCOPIC COMMENT: NORMAL
NITRITE UR QL STRIP: NEGATIVE
PH UR STRIP: 6 [PH] (ref 5–8)
PROT UR QL STRIP: NEGATIVE
RBC #/AREA URNS HPF: 4 /HPF (ref 0–4)
SP GR UR STRIP: 1.01 (ref 1–1.03)
URN SPEC COLLECT METH UR: ABNORMAL

## 2018-05-04 PROCEDURE — 77063 BREAST TOMOSYNTHESIS BI: CPT | Mod: 26,,, | Performed by: RADIOLOGY

## 2018-05-04 PROCEDURE — G0101 CA SCREEN;PELVIC/BREAST EXAM: HCPCS | Mod: S$GLB,,, | Performed by: NURSE PRACTITIONER

## 2018-05-04 PROCEDURE — 77067 SCR MAMMO BI INCL CAD: CPT | Mod: TC,PO

## 2018-05-04 PROCEDURE — 81002 URINALYSIS NONAUTO W/O SCOPE: CPT | Mod: S$GLB,,, | Performed by: NURSE PRACTITIONER

## 2018-05-04 PROCEDURE — 77067 SCR MAMMO BI INCL CAD: CPT | Mod: 26,,, | Performed by: RADIOLOGY

## 2018-05-04 PROCEDURE — 99999 PR PBB SHADOW E&M-EST. PATIENT-LVL III: CPT | Mod: PBBFAC,,, | Performed by: NURSE PRACTITIONER

## 2018-05-04 PROCEDURE — 81000 URINALYSIS NONAUTO W/SCOPE: CPT | Mod: PO

## 2018-05-04 PROCEDURE — 87086 URINE CULTURE/COLONY COUNT: CPT

## 2018-05-04 RX ORDER — OXYCODONE AND ACETAMINOPHEN 7.5; 325 MG/1; MG/1
TABLET ORAL
COMMUNITY
End: 2018-05-04

## 2018-05-04 RX ORDER — OLMESARTAN MEDOXOMIL AND HYDROCHLOROTHIAZIDE 40/25 40; 25 MG/1; MG/1
1 TABLET ORAL DAILY
Refills: 1 | COMMUNITY
Start: 2018-02-26 | End: 2018-12-28

## 2018-05-04 RX ORDER — ESTRADIOL 0.1 MG/G
CREAM VAGINAL
COMMUNITY
Start: 2015-10-20 | End: 2018-05-04 | Stop reason: SDUPTHER

## 2018-05-04 RX ORDER — VENLAFAXINE HYDROCHLORIDE 75 MG/1
CAPSULE, EXTENDED RELEASE ORAL
Refills: 1 | COMMUNITY
Start: 2018-01-28 | End: 2018-05-04

## 2018-05-04 RX ORDER — NITROFURANTOIN 25; 75 MG/1; MG/1
100 CAPSULE ORAL 2 TIMES DAILY
Qty: 14 CAPSULE | Refills: 0 | Status: SHIPPED | OUTPATIENT
Start: 2018-05-04 | End: 2018-05-11

## 2018-05-04 RX ORDER — MIRTAZAPINE 15 MG/1
TABLET, FILM COATED ORAL
COMMUNITY
Start: 2018-04-21 | End: 2018-12-28

## 2018-05-04 NOTE — PROGRESS NOTES
"CC: Well woman exam    Ju Sinclair is a 58 y.o. female  presents for well woman exam.  LMP: No LMP recorded. Patient is postmenopausal..    Having some urine frequency that started   MMG today after visit.     Past Medical History:   Diagnosis Date    Anxiety disorder     Breast disorder     Hypertension     Mental disorder     Neuropathy     Sleep apnea      Past Surgical History:   Procedure Laterality Date     SECTION      SHOULDER SURGERY      TONSILLECTOMY       Social History     Social History    Marital status:      Spouse name: N/A    Number of children: N/A    Years of education: N/A     Occupational History    Not on file.     Social History Main Topics    Smoking status: Never Smoker    Smokeless tobacco: Never Used    Alcohol use No    Drug use: No    Sexual activity: No     Other Topics Concern    Not on file     Social History Narrative    No narrative on file     Family History   Problem Relation Age of Onset    Eclampsia Paternal Grandmother     Diabetes Paternal Grandmother     Breast cancer Sister     Colon cancer Maternal Grandfather     Ovarian cancer Neg Hx     Cancer Neg Hx     Hypertension Neg Hx     Miscarriages / Stillbirths Neg Hx      labor Neg Hx     Stroke Neg Hx      OB History      Para Term  AB Living    2 2 2     2    SAB TAB Ectopic Multiple Live Births                       /82   Ht 5' 6" (1.676 m)   Wt 100.7 kg (222 lb 0.1 oz)   BMI 35.83 kg/m²       ROS:  GENERAL: Denies weight gain or weight loss. Feeling well overall.   SKIN: Denies rash or lesions.   HEAD: Denies head injury or headache.   NODES: Denies enlarged lymph nodes.   CHEST: Denies chest pain or shortness of breath.   CARDIOVASCULAR: Denies palpitations or left sided chest pain.   ABDOMEN: No abdominal pain, constipation, diarrhea, nausea, vomiting or rectal bleeding.   URINARY: No frequency, dysuria, hematuria, or burning on " urination.  REPRODUCTIVE: See HPI.   BREASTS: The patient performs breast self-examination and denies pain, lumps, or nipple discharge.   HEMATOLOGIC: No easy bruisability or excessive bleeding.   MUSCULOSKELETAL: Denies joint pain or swelling.   NEUROLOGIC: Denies syncope or weakness.   PSYCHIATRIC: Denies depression, anxiety or mood swings.    PHYSICAL EXAM:  APPEARANCE: Well nourished, well developed, in no acute distress.  AFFECT: WNL, alert and oriented x 3  SKIN: No acne or hirsutism  NECK: Neck symmetric without masses or thyromegaly  NODES: No inguinal, cervical, axillary, or femoral lymph node enlargement  CHEST: Good respiratory effect  ABDOMEN: Soft.  No tenderness or masses.  No hepatosplenomegaly.  No hernias.  BREASTS: Symmetrical, no skin changes or visible lesions.  No palpable masses, nipple discharge bilaterally.  PELVIC: Normal external genitalia without lesions.  Normal hair distribution.  Adequate perineal body, normal urethral meatus.  Vagina moist and well rugated without lesions or discharge.  Cervix pink, without lesions, discharge or tenderness.  No significant cystocele or rectocele.  Bimanual exam shows uterus to be enlarged 10-12 week  size, regular, mobile and tender.  Adnexa without masses or tenderness.    EXTREMITIES: No edema.  Physical Exam    1. Encounter for gynecological examination without abnormal finding     2. Urine frequency  POCT urine dipstick without microscope    Urinalysis    Urine culture   3. Urinary tract infection with hematuria, site unspecified  POCT urine dipstick without microscope    Urinalysis    Urine culture    AND PLAN:    Patient was counseled today on A.C.S. Pap guidelines and recommendations for yearly pelvic exams, mammograms and monthly self breast exams; to see her PCP for other health maintenance.     ua dip with large blood and trace protein  Check urine and culture through lab

## 2018-05-05 LAB — BACTERIA UR CULT: NO GROWTH

## 2018-05-11 ENCOUNTER — TELEPHONE (OUTPATIENT)
Dept: RADIOLOGY | Facility: HOSPITAL | Age: 58
End: 2018-05-11

## 2018-05-14 ENCOUNTER — HOSPITAL ENCOUNTER (OUTPATIENT)
Dept: RADIOLOGY | Facility: HOSPITAL | Age: 58
Discharge: HOME | End: 2018-05-14
Attending: NURSE PRACTITIONER
Payer: MEDICARE

## 2018-05-14 ENCOUNTER — OFFICE VISIT (OUTPATIENT)
Dept: OPHTHALMOLOGY | Facility: CLINIC | Age: 58
End: 2018-05-14
Payer: MEDICARE

## 2018-05-14 DIAGNOSIS — H43.812 POSTERIOR VITREOUS DETACHMENT OF LEFT EYE: Primary | ICD-10-CM

## 2018-05-14 DIAGNOSIS — H52.4 MYOPIA WITH PRESBYOPIA OF BOTH EYES: ICD-10-CM

## 2018-05-14 DIAGNOSIS — H43.392 VITREOUS FLOATERS OF LEFT EYE: ICD-10-CM

## 2018-05-14 DIAGNOSIS — R10.2 PELVIC PAIN IN FEMALE: ICD-10-CM

## 2018-05-14 DIAGNOSIS — M79.7 FIBROMYALGIA: ICD-10-CM

## 2018-05-14 DIAGNOSIS — H04.123 DRY EYES, BILATERAL: ICD-10-CM

## 2018-05-14 DIAGNOSIS — H52.13 MYOPIA WITH PRESBYOPIA OF BOTH EYES: ICD-10-CM

## 2018-05-14 DIAGNOSIS — I10 HYPERTENSION, UNSPECIFIED TYPE: ICD-10-CM

## 2018-05-14 DIAGNOSIS — N85.2 ENLARGED UTERUS: ICD-10-CM

## 2018-05-14 PROBLEM — E78.5 HYPERLIPIDEMIA: Status: ACTIVE | Noted: 2018-05-14

## 2018-05-14 PROBLEM — F32.A DEPRESSION: Status: ACTIVE | Noted: 2018-05-14

## 2018-05-14 PROBLEM — F41.9 ANXIETY: Status: ACTIVE | Noted: 2018-05-14

## 2018-05-14 PROCEDURE — 92015 DETERMINE REFRACTIVE STATE: CPT | Mod: S$GLB,,, | Performed by: OPTOMETRIST

## 2018-05-14 PROCEDURE — 99999 PR PBB SHADOW E&M-EST. PATIENT-LVL I: CPT | Mod: PBBFAC,,, | Performed by: OPTOMETRIST

## 2018-05-14 PROCEDURE — 76830 TRANSVAGINAL US NON-OB: CPT | Mod: 26,,, | Performed by: RADIOLOGY

## 2018-05-14 PROCEDURE — 92004 COMPRE OPH EXAM NEW PT 1/>: CPT | Mod: S$GLB,,, | Performed by: OPTOMETRIST

## 2018-05-14 PROCEDURE — 76856 US EXAM PELVIC COMPLETE: CPT | Mod: 26,,, | Performed by: RADIOLOGY

## 2018-05-14 PROCEDURE — 76830 TRANSVAGINAL US NON-OB: CPT | Mod: TC,PO

## 2018-05-14 NOTE — PROGRESS NOTES
HPI     Hypertensive Eye Exam    Additional comments: Yearly           Comments     NP to [facility].   Pt has noticed some changes in vision since last eye exam  Wears PAL glasses full-time last updated 3 years ago  C/O floaters in both eyes described as little black spots moving in   central vision.  Floaters have been present in the right eye 2 years left eye 6-7 months.  No other complaints  No drops         Last edited by Keren Sanchez, OD on 5/14/2018  3:22 PM. (History)            Assessment /Plan     For exam results, see Encounter Report.    Posterior vitreous detachment of left eye    Vitreous floaters of left eye    Hypertension, unspecified type    Fibromyalgia    Dry eyes, bilateral    Myopia with presbyopia of both eyes      PVD with vitreous floater left eye without retinal tear or detachment.  Return to clinic with any increase in floaters, flashes or curtain effect.  No hypertensive retinopathy in either eye.  Artificial tears as needed..  Glaucoma screening and fundus exam normal.  Updated glasses prescription.  Return to clinic 1 yr.

## 2018-05-22 ENCOUNTER — TELEPHONE (OUTPATIENT)
Dept: OBSTETRICS AND GYNECOLOGY | Facility: CLINIC | Age: 58
End: 2018-05-22

## 2018-05-22 DIAGNOSIS — N39.0 URINARY TRACT INFECTION WITH HEMATURIA, SITE UNSPECIFIED: ICD-10-CM

## 2018-05-22 DIAGNOSIS — R35.0 URINE FREQUENCY: Primary | ICD-10-CM

## 2018-05-22 DIAGNOSIS — R31.9 URINARY TRACT INFECTION WITH HEMATURIA, SITE UNSPECIFIED: ICD-10-CM

## 2018-05-22 NOTE — TELEPHONE ENCOUNTER
Attempted to put in referral and this urologist is not coming up - try to get exact spelling of name of urologist - nothing is coming up   This person could be out of her network???

## 2018-05-22 NOTE — TELEPHONE ENCOUNTER
Spoke with pt and informed her of the recommendation below. Pt stated she believe she was seen at that facility once before and refused to pay the $1300 bill that she received. I told pt to call [facility] just to make sure and then give us a call back. Pt verbalized understanding.

## 2018-05-22 NOTE — TELEPHONE ENCOUNTER
----- Message from Krystina Dickson sent at 5/22/2018 11:00 AM CDT -----  Pt is requesting a call from nurse to get a referral for a urologist. Please fax referral to Dr Elizabeth Collins office at [phone].          Please call pt back at [phone]

## 2018-11-07 ENCOUNTER — OFFICE VISIT (OUTPATIENT)
Dept: OBSTETRICS AND GYNECOLOGY | Facility: CLINIC | Age: 58
End: 2018-11-07
Payer: MEDICARE

## 2018-11-07 VITALS
WEIGHT: 226.63 LBS | DIASTOLIC BLOOD PRESSURE: 82 MMHG | BODY MASS INDEX: 36.42 KG/M2 | SYSTOLIC BLOOD PRESSURE: 114 MMHG | HEIGHT: 66 IN

## 2018-11-07 DIAGNOSIS — R10.2 PELVIC PAIN IN FEMALE: Primary | ICD-10-CM

## 2018-11-07 DIAGNOSIS — N95.2 VAGINAL ATROPHY: ICD-10-CM

## 2018-11-07 DIAGNOSIS — R10.32 LEFT INGUINAL PAIN: ICD-10-CM

## 2018-11-07 DIAGNOSIS — R31.9 HEMATURIA, UNSPECIFIED TYPE: ICD-10-CM

## 2018-11-07 LAB
BILIRUB UR QL STRIP: NEGATIVE
CLARITY UR: CLEAR
COLOR UR: YELLOW
GLUCOSE UR QL STRIP: NEGATIVE
HGB UR QL STRIP: ABNORMAL
KETONES UR QL STRIP: NEGATIVE
LEUKOCYTE ESTERASE UR QL STRIP: NEGATIVE
MICROSCOPIC COMMENT: NORMAL
NITRITE UR QL STRIP: NEGATIVE
PH UR STRIP: 6 [PH] (ref 5–8)
PROT UR QL STRIP: NEGATIVE
RBC #/AREA URNS HPF: 2 /HPF (ref 0–4)
SP GR UR STRIP: <=1.005 (ref 1–1.03)
URN SPEC COLLECT METH UR: ABNORMAL
WBC #/AREA URNS HPF: 1 /HPF (ref 0–5)

## 2018-11-07 PROCEDURE — 81002 URINALYSIS NONAUTO W/O SCOPE: CPT | Mod: S$GLB,,, | Performed by: NURSE PRACTITIONER

## 2018-11-07 PROCEDURE — 99214 OFFICE O/P EST MOD 30 MIN: CPT | Mod: 25,S$GLB,, | Performed by: NURSE PRACTITIONER

## 2018-11-07 PROCEDURE — 3008F BODY MASS INDEX DOCD: CPT | Mod: CPTII,S$GLB,, | Performed by: NURSE PRACTITIONER

## 2018-11-07 PROCEDURE — 99999 PR PBB SHADOW E&M-EST. PATIENT-LVL III: CPT | Mod: PBBFAC,,, | Performed by: NURSE PRACTITIONER

## 2018-11-07 PROCEDURE — 81000 URINALYSIS NONAUTO W/SCOPE: CPT | Mod: PO

## 2018-11-07 PROCEDURE — 87210 SMEAR WET MOUNT SALINE/INK: CPT | Mod: QW,S$GLB,, | Performed by: NURSE PRACTITIONER

## 2018-11-07 PROCEDURE — 3079F DIAST BP 80-89 MM HG: CPT | Mod: CPTII,S$GLB,, | Performed by: NURSE PRACTITIONER

## 2018-11-07 PROCEDURE — 3074F SYST BP LT 130 MM HG: CPT | Mod: CPTII,S$GLB,, | Performed by: NURSE PRACTITIONER

## 2018-11-07 PROCEDURE — 87086 URINE CULTURE/COLONY COUNT: CPT

## 2018-11-07 RX ORDER — GABAPENTIN 300 MG/1
300 CAPSULE ORAL 3 TIMES DAILY
Refills: 1 | COMMUNITY
Start: 2018-11-04 | End: 2018-12-28

## 2018-11-07 RX ORDER — TRAMADOL HYDROCHLORIDE 50 MG/1
50 TABLET ORAL EVERY 12 HOURS PRN
Refills: 0 | COMMUNITY
Start: 2018-11-04 | End: 2018-12-28

## 2018-11-07 NOTE — PROGRESS NOTES
CC:  Pelvic pain    Ju Sinclair is a 58 y.o. female  presents for persistant pain for the last 2 week(s) mainly to left groin area.  Has discharge and feels has uti.    UA dip with blood and protein, same as in May with negative culture.  Was instructed to see urology pt states she did and was told was just going to have blood in urine.  Pelvic u/s done in May showed small fibroid.   Off her estrace cream.      No LMP recorded. Patient is postmenopausal.    Past Medical History:   Diagnosis Date    Anxiety disorder     Breast disorder     Hyperlipidemia 2018    Hypertension     Mental disorder     Neuropathy     Sleep apnea      Past Surgical History:   Procedure Laterality Date     SECTION      SHOULDER SURGERY      TONSILLECTOMY       Family History   Problem Relation Age of Onset    Eclampsia Paternal Grandmother     Diabetes Paternal Grandmother     Breast cancer Sister     Colon cancer Maternal Grandfather     Ovarian cancer Neg Hx     Cancer Neg Hx     Hypertension Neg Hx     Miscarriages / Stillbirths Neg Hx      labor Neg Hx     Stroke Neg Hx      Social History     Socioeconomic History    Marital status:      Spouse name: Not on file    Number of children: Not on file    Years of education: Not on file    Highest education level: Not on file   Social Needs    Financial resource strain: Not on file    Food insecurity - worry: Not on file    Food insecurity - inability: Not on file    Transportation needs - medical: Not on file    Transportation needs - non-medical: Not on file   Occupational History    Not on file   Tobacco Use    Smoking status: Never Smoker    Smokeless tobacco: Never Used   Substance and Sexual Activity    Alcohol use: No     Alcohol/week: 0.0 oz    Drug use: No    Sexual activity: Yes     Partners: Male     Birth control/protection: None   Other Topics Concern    Not on file   Social History Narrative    Not on  "file     OB History    Para Term  AB Living   2 2 2     2   SAB TAB Ectopic Multiple Live Births           2      # Outcome Date GA Lbr Vinicio/2nd Weight Sex Delivery Anes PTL Lv   2 Term      CS-Unspec      1 Term      CS-Unspec             /82   Ht 5' 6" (1.676 m)   Wt 102.8 kg (226 lb 10.1 oz)   BMI 36.58 kg/m²         ROS:  GENERAL: Denies weight gain or weight loss. Feeling well overall.   SKIN: Denies rash or lesions.   HEAD: Denies head injury or headache.   NODES: Denies enlarged lymph nodes.   CHEST: Denies chest pain or shortness of breath.   CARDIOVASCULAR: Denies palpitations or left sided chest pain.   ABDOMEN: No abdominal pain, constipation, diarrhea, nausea, vomiting or rectal bleeding.   URINARY: No frequency, dysuria, hematuria, or burning on urination.  REPRODUCTIVE: See HPI.   BREASTS: The patient performs breast self-examination and denies pain, lumps, or nipple discharge.   HEMATOLOGIC: No easy bruisability or excessive bleeding.   MUSCULOSKELETAL: Denies joint pain or swelling.   NEUROLOGIC: Denies syncope or weakness.   PSYCHIATRIC: Denies depression, anxiety or mood swings.    PHYSICAL EXAM:  APPEARANCE: Well nourished, well developed, in no acute distress.  AFFECT: WNL, alert and oriented x 3  SKIN: No acne or hirsutism    PELVIC: Normal external genitalia without lesions.  Normal hair distribution.  Adequate perineal body, normal urethral meatus.  Vagina moist and well rugated without lesions or discharge.  Cervix pink, without lesions, discharge or tenderness.  No significant cystocele or rectocele.  Bimanual exam shows uterus to be 8-10 week size, regular, mobile and nontender.  Adnexa without masses or tenderness.  Pain with ROM noted to pelvis and hips.     EXTREMITIES: No edema.    AND PLAN:    Ju was seen today for urinary tract infection.    Diagnoses and all orders for this visit:    Pelvic pain in female  -     POCT Wet Prep  -     POCT urine dipstick " without microscope  -     Urinalysis  -     Urine culture  -     US Pelvis Comp with Transvag NON-OB (xpd; Future    Left inguinal pain  -     POCT Wet Prep  -     POCT urine dipstick without microscope  -     Urinalysis  -     Urine culture  -     US Pelvis Comp with Transvag NON-OB (xpd; Future    Hematuria, unspecified type  -     POCT urine dipstick without microscope  -     Urinalysis  -     Urine culture  -     US Pelvis Comp with Transvag NON-OB (xpd; Future    Vaginal atrophy  -     prasterone, dhea, (INTRAROSA) 6.5 mg Inst; Place 6.5 mg vaginally every evening.           did not recommend repeating us at this time but pt wants to recheck  Recommend she see her pcp for the pain in left groin  Start intrarosa  Wet prep was negative   If pain were to [place]ors[place] to ER for workup

## 2018-11-08 ENCOUNTER — HOSPITAL ENCOUNTER (OUTPATIENT)
Dept: RADIOLOGY | Facility: HOSPITAL | Age: 58
Discharge: HOME | End: 2018-11-08
Attending: NURSE PRACTITIONER
Payer: MEDICARE

## 2018-11-08 DIAGNOSIS — R10.2 PELVIC PAIN IN FEMALE: ICD-10-CM

## 2018-11-08 DIAGNOSIS — R31.9 HEMATURIA, UNSPECIFIED TYPE: ICD-10-CM

## 2018-11-08 DIAGNOSIS — R10.32 LEFT INGUINAL PAIN: ICD-10-CM

## 2018-11-09 ENCOUNTER — HOSPITAL ENCOUNTER (OUTPATIENT)
Dept: RADIOLOGY | Facility: HOSPITAL | Age: 58
Discharge: HOME | End: 2018-11-09
Attending: NURSE PRACTITIONER
Payer: MEDICARE

## 2018-11-09 PROCEDURE — 76856 US EXAM PELVIC COMPLETE: CPT | Mod: 26,,, | Performed by: RADIOLOGY

## 2018-11-09 PROCEDURE — 76830 TRANSVAGINAL US NON-OB: CPT | Mod: 26,,, | Performed by: RADIOLOGY

## 2018-11-09 PROCEDURE — 76856 US EXAM PELVIC COMPLETE: CPT | Mod: TC,PO

## 2018-11-09 PROCEDURE — 76830 TRANSVAGINAL US NON-OB: CPT | Mod: TC,PO

## 2018-11-10 LAB — BACTERIA UR CULT: NORMAL

## 2018-11-12 ENCOUNTER — PATIENT MESSAGE (OUTPATIENT)
Dept: OBSTETRICS AND GYNECOLOGY | Facility: CLINIC | Age: 58
End: 2018-11-12

## 2018-11-13 ENCOUNTER — TELEPHONE (OUTPATIENT)
Dept: PHARMACY | Facility: CLINIC | Age: 58
End: 2018-11-13

## 2018-11-13 NOTE — TELEPHONE ENCOUNTER
Reason for call:    Notify patient PA submitted for Intrarosa 6.5mg tablets to [employer] @ 4:26pm on 11/13/18.    Will notify patient and provider upon approval / denial.    Thanks,   Keren Castro CPhT, B.A  Patient Care Advocate   [facility]  Phone: [phone] Ext [phone]  Fax: [phone]

## 2018-11-14 ENCOUNTER — TELEPHONE (OUTPATIENT)
Dept: PHARMACY | Facility: CLINIC | Age: 58
End: 2018-11-14

## 2018-11-14 NOTE — TELEPHONE ENCOUNTER
Called pt to notify her that we had to complete her PA on her medication intrarosa, pt stated that she had received a call from them already stated that she had been approved...chandni.

## 2018-11-14 NOTE — TELEPHONE ENCOUNTER
Let pt know her intrarosa was covered and will cost minimal out of pocket, she can go to pharmacy at [facility] to  - see note below

## 2018-11-14 NOTE — TELEPHONE ENCOUNTER
Let pt know PA was put in for her intrarosa, once we hear acceptance or denial she will be notified

## 2018-11-28 ENCOUNTER — OFFICE VISIT (OUTPATIENT)
Dept: OBSTETRICS AND GYNECOLOGY | Facility: CLINIC | Age: 58
End: 2018-11-28
Payer: MEDICARE

## 2018-11-28 VITALS
DIASTOLIC BLOOD PRESSURE: 86 MMHG | SYSTOLIC BLOOD PRESSURE: 156 MMHG | BODY MASS INDEX: 36.14 KG/M2 | HEIGHT: 66 IN | WEIGHT: 224.88 LBS

## 2018-11-28 DIAGNOSIS — M79.605 LEFT LEG PAIN: ICD-10-CM

## 2018-11-28 DIAGNOSIS — M94.9 DISORDER OF CARTILAGE: ICD-10-CM

## 2018-11-28 DIAGNOSIS — R60.0 LEG EDEMA, LEFT: ICD-10-CM

## 2018-11-28 DIAGNOSIS — M79.605 LEG PAIN, POSTERIOR, LEFT: ICD-10-CM

## 2018-11-28 DIAGNOSIS — D25.9 UTERINE LEIOMYOMA, UNSPECIFIED LOCATION: Primary | ICD-10-CM

## 2018-11-28 DIAGNOSIS — R60.0 LEG EDEMA, LEFT: Primary | ICD-10-CM

## 2018-11-28 DIAGNOSIS — Z13.820 OSTEOPOROSIS SCREENING: ICD-10-CM

## 2018-11-28 PROCEDURE — 3077F SYST BP >= 140 MM HG: CPT | Mod: CPTII,S$GLB,, | Performed by: OBSTETRICS & GYNECOLOGY

## 2018-11-28 PROCEDURE — 99213 OFFICE O/P EST LOW 20 MIN: CPT | Mod: S$GLB,,, | Performed by: OBSTETRICS & GYNECOLOGY

## 2018-11-28 PROCEDURE — 3079F DIAST BP 80-89 MM HG: CPT | Mod: CPTII,S$GLB,, | Performed by: OBSTETRICS & GYNECOLOGY

## 2018-11-28 PROCEDURE — 3008F BODY MASS INDEX DOCD: CPT | Mod: CPTII,S$GLB,, | Performed by: OBSTETRICS & GYNECOLOGY

## 2018-11-28 PROCEDURE — 99999 PR PBB SHADOW E&M-EST. PATIENT-LVL IV: CPT | Mod: PBBFAC,,, | Performed by: OBSTETRICS & GYNECOLOGY

## 2018-11-28 NOTE — PROGRESS NOTES
Subjective:       Patient ID: Ju Sinclair is a 58 y.o. female.    Chief Complaint:  Follow-up      History of Present Illness  HPI  here for f/u pelvic ultrasound done for pain. pt also c/o lower extremity edema & pain    GYN & OB History  No LMP recorded. Patient is postmenopausal.   Date of Last Pap: 2016 U/S:  FINDINGS:  The uterus measures 9.0 cm in length. There is a 9 mm fibroid noted within the posterior body of the uterus.  The myometrium is diffusely heterogeneous.  The endometrial stripe in this premenopausal patient measures 2.7 mm which is within normal limits.  The right ovary measures 2.0 x 1.1 x 0.9 cm.  The left ovary measures 2.3 x 1.0 x 1.4 cm .   Arterial flow demonstrated to both ovaries.  No free fluid identified.    OB History    Para Term  AB Living   2 2 2     2   SAB TAB Ectopic Multiple Live Births           2      # Outcome Date GA Lbr Vinicio/2nd Weight Sex Delivery Anes PTL Lv   2 Term 91    M CS-Unspec   LAURA   1 Term 80    M CS-Unspec   LAURA          Review of Systems  Review of Systems   All other systems reviewed and are negative.      Objective:     Physical Exam   Constitutional: She is oriented to person, place, and time. She appears well-developed and well-nourished.   Pulmonary/Chest: Effort normal.   Musculoskeletal: Normal range of motion. She exhibits edema.   Edema symmetrical 2+ but pt reports pain more on left   Neurological: She is alert and oriented to person, place, and time.   Skin: Skin is warm and dry.   Psychiatric: She has a normal mood and affect. Her behavior is normal.        Assessment:        1. Uterine leiomyoma, unspecified location    2. Lower extremity edema & pain          Plan:      1. Benign/reassuring pelvic u/s results discussed  2. Left lower extremity doppler studies

## 2018-11-29 ENCOUNTER — APPOINTMENT (OUTPATIENT)
Dept: RADIOLOGY | Facility: HOSPITAL | Age: 58
End: 2018-11-29
Attending: OBSTETRICS & GYNECOLOGY
Payer: MEDICARE

## 2018-11-29 DIAGNOSIS — M94.9 DISORDER OF CARTILAGE: ICD-10-CM

## 2018-11-29 DIAGNOSIS — Z13.820 OSTEOPOROSIS SCREENING: ICD-10-CM

## 2018-11-29 PROCEDURE — 77080 DXA BONE DENSITY AXIAL: CPT | Mod: TC,PO

## 2018-11-29 PROCEDURE — 77080 DXA BONE DENSITY AXIAL: CPT | Mod: 26,,, | Performed by: RADIOLOGY

## 2018-11-30 ENCOUNTER — HOSPITAL ENCOUNTER (OUTPATIENT)
Dept: RADIOLOGY | Facility: HOSPITAL | Age: 58
Discharge: HOME | End: 2018-11-30
Attending: OBSTETRICS & GYNECOLOGY
Payer: MEDICARE

## 2018-11-30 DIAGNOSIS — M79.605 LEFT LEG PAIN: ICD-10-CM

## 2018-11-30 DIAGNOSIS — R60.0 LEG EDEMA, LEFT: ICD-10-CM

## 2018-11-30 PROCEDURE — 93971 EXTREMITY STUDY: CPT | Mod: TC,PO

## 2018-11-30 PROCEDURE — 93971 EXTREMITY STUDY: CPT | Mod: 26,,, | Performed by: RADIOLOGY

## 2019-03-19 ENCOUNTER — OFFICE VISIT (OUTPATIENT)
Dept: INTERNAL MEDICINE | Facility: CLINIC | Age: 59
End: 2019-03-19
Payer: MEDICARE

## 2019-03-19 VITALS
BODY MASS INDEX: 35.29 KG/M2 | HEIGHT: 66 IN | HEART RATE: 79 BPM | OXYGEN SATURATION: 99 % | DIASTOLIC BLOOD PRESSURE: 86 MMHG | WEIGHT: 219.56 LBS | TEMPERATURE: 98 F | SYSTOLIC BLOOD PRESSURE: 140 MMHG

## 2019-03-19 DIAGNOSIS — F32.A DEPRESSION, UNSPECIFIED DEPRESSION TYPE: Chronic | ICD-10-CM

## 2019-03-19 DIAGNOSIS — F41.9 ANXIETY: ICD-10-CM

## 2019-03-19 DIAGNOSIS — I10 ESSENTIAL HYPERTENSION: Primary | ICD-10-CM

## 2019-03-19 PROCEDURE — 3008F PR BODY MASS INDEX (BMI) DOCUMENTED: ICD-10-PCS | Mod: CPTII,S$GLB,, | Performed by: FAMILY MEDICINE

## 2019-03-19 PROCEDURE — 99999 PR PBB SHADOW E&M-EST. PATIENT-LVL III: CPT | Mod: PBBFAC,,, | Performed by: FAMILY MEDICINE

## 2019-03-19 PROCEDURE — 3079F DIAST BP 80-89 MM HG: CPT | Mod: CPTII,S$GLB,, | Performed by: FAMILY MEDICINE

## 2019-03-19 PROCEDURE — 3077F SYST BP >= 140 MM HG: CPT | Mod: CPTII,S$GLB,, | Performed by: FAMILY MEDICINE

## 2019-03-19 PROCEDURE — 3077F PR MOST RECENT SYSTOLIC BLOOD PRESSURE >= 140 MM HG: ICD-10-PCS | Mod: CPTII,S$GLB,, | Performed by: FAMILY MEDICINE

## 2019-03-19 PROCEDURE — 99999 PR PBB SHADOW E&M-EST. PATIENT-LVL III: ICD-10-PCS | Mod: PBBFAC,,, | Performed by: FAMILY MEDICINE

## 2019-03-19 PROCEDURE — 99214 PR OFFICE/OUTPT VISIT, EST, LEVL IV, 30-39 MIN: ICD-10-PCS | Mod: S$GLB,,, | Performed by: FAMILY MEDICINE

## 2019-03-19 PROCEDURE — 3008F BODY MASS INDEX DOCD: CPT | Mod: CPTII,S$GLB,, | Performed by: FAMILY MEDICINE

## 2019-03-19 PROCEDURE — 99214 OFFICE O/P EST MOD 30 MIN: CPT | Mod: S$GLB,,, | Performed by: FAMILY MEDICINE

## 2019-03-19 PROCEDURE — 3079F PR MOST RECENT DIASTOLIC BLOOD PRESSURE 80-89 MM HG: ICD-10-PCS | Mod: CPTII,S$GLB,, | Performed by: FAMILY MEDICINE

## 2019-03-19 RX ORDER — AMLODIPINE BESYLATE 10 MG/1
10 TABLET ORAL DAILY
Refills: 11 | COMMUNITY
Start: 2019-03-07 | End: 2019-03-19 | Stop reason: SDUPTHER

## 2019-03-19 RX ORDER — AMLODIPINE BESYLATE 10 MG/1
10 TABLET ORAL DAILY
Qty: 30 TABLET | Refills: 0 | Status: SHIPPED | OUTPATIENT
Start: 2019-03-19

## 2019-03-19 RX ORDER — METOPROLOL SUCCINATE 50 MG/1
50 TABLET, EXTENDED RELEASE ORAL DAILY
Qty: 30 TABLET | Refills: 0 | Status: SHIPPED | OUTPATIENT
Start: 2019-03-19

## 2019-03-19 NOTE — PROGRESS NOTES
Subjective:   Patient ID: Ju Sinclair is a 59 y.o. female.  Chief Complaint:  Hypertension; Headache; and Neck Pain      Patient presents more urgent care visit for elevated blood pressure, headache, neck pain.    Sees outside PCP.    History hypertension.  Medical noncompliance of unclear etiology.  Apparently on amlodipine 10 mg daily and Toprol XL 25 mg daily most recently, but no refills available per record.  This morning at [place] [facility] blood pressure elevated.  Advised to see MD.  Also history anxiety.  Not taking any medication.    Presently seeing Dr. Kauffman for neck pain followed by worker's comp injury and prescribe Norco.  Reports cardiologist is Dr. Mustafa.    Overall poor historian and difficult to obtain adequate medical history.  Blood pressure mildly elevated in office.    Denies chest pain or shortness of breath presently.        Current Outpatient Medications:     amLODIPine (NORVASC) 10 MG tablet, Take 1 tablet (10 mg total) by mouth once daily., Disp: 30 tablet, Rfl: 0    doxepin (ZONALON) 5 % cream, , Disp: , Rfl:     HYDROcodone-acetaminophen (NORCO) 7.5-325 mg per tablet, TAKE 1 TABLET BY MOUTH EVERY 12 HOURS AS NEEDED FOR 30 DAYS (PA WAITING), Disp: , Rfl: 0    metoprolol succinate (TOPROL-XL) 50 MG 24 hr tablet, Take 1 tablet (50 mg total) by mouth once daily., Disp: 30 tablet, Rfl: 0    Review of Systems   Constitutional: Negative for activity change, appetite change and fatigue.   Eyes: Negative for visual disturbance.   Respiratory: Negative for cough, chest tightness, shortness of breath and wheezing.    Cardiovascular: Negative for chest pain, palpitations and leg swelling.   Gastrointestinal: Negative for abdominal pain, constipation, diarrhea, nausea and vomiting.   Genitourinary: Negative for difficulty urinating and pelvic pain.   Musculoskeletal: Positive for neck pain. Negative for back pain and myalgias.   Skin: Negative for rash.   Neurological: Positive for  "headaches. Negative for dizziness, tremors, syncope, weakness, light-headedness and numbness.   Psychiatric/Behavioral: Positive for decreased concentration and dysphoric mood. Negative for agitation, behavioral problems, confusion, hallucinations, self-injury, sleep disturbance and suicidal ideas. The patient is nervous/anxious. The patient is not hyperactive.      Objective:   BP (!) 140/86 (BP Location: Right arm, Patient Position: Sitting, BP Method: Large (Manual))   Pulse 79   Temp 98 °F (36.7 °C) (Tympanic)   Ht 5' 6" (1.676 m)   Wt 99.6 kg (219 lb 9.3 oz)   SpO2 99%   BMI 35.44 kg/m²     Physical Exam   Constitutional: Vital signs are normal. She appears well-developed and well-nourished. No distress.   Blood pressure elevated.  Obesity.   Neck: No JVD present. No thyroid mass and no thyromegaly present.   Cardiovascular: Normal rate, regular rhythm and normal heart sounds. Exam reveals no gallop and no friction rub.   No murmur heard.  Pulmonary/Chest: Effort normal and breath sounds normal. She has no wheezes. She has no rhonchi. She has no rales.   Abdominal: Soft. She exhibits no distension. There is no tenderness.   Musculoskeletal: She exhibits no edema.   Skin: Skin is warm, dry and intact. No rash noted.   Psychiatric: Her behavior is normal. Judgment and thought content normal. Her mood appears anxious. Her speech is tangential. Her speech is not rapid and/or pressured, not delayed and not slurred. She is not actively hallucinating. Cognition and memory are normal. She is communicative. She is inattentive.   Nursing note and vitals reviewed.    Assessment:       ICD-10-CM ICD-9-CM   1. Essential hypertension I10 401.9   2. Anxiety F41.9 300.00   3. Depression, unspecified depression type F32.9 311     Plan:   Essential hypertension  -     metoprolol succinate (TOPROL-XL) 50 MG 24 hr tablet; Take 1 tablet (50 mg total) by mouth once daily.  Dispense: 30 tablet; Refill: 0  -     amLODIPine " (NORVASC) 10 MG tablet; Take 1 tablet (10 mg total) by mouth once daily.  Dispense: 30 tablet; Refill: 0  Continue amlodipine 10 mg daily.    Increased Toprol-XL 50 mg daily.    Stress need for compliance.    Take medicine every day.      Anxiety  Depression, unspecified depression type  Upon further questioning Effexor was used in past.    No recent prescription to use.    Declines starting any preventive medication in urgent care.    Advised to discuss appropriate treatment with PCP at follow-up.    Follow up PCP 2-3 weeks.

## 2019-05-10 ENCOUNTER — OFFICE VISIT (OUTPATIENT)
Dept: INTERNAL MEDICINE | Facility: CLINIC | Age: 59
End: 2019-05-10
Payer: MEDICARE

## 2019-05-10 ENCOUNTER — HOSPITAL ENCOUNTER (OUTPATIENT)
Dept: RADIOLOGY | Facility: HOSPITAL | Age: 59
Discharge: HOME | End: 2019-05-10
Attending: NURSE PRACTITIONER
Payer: MEDICARE

## 2019-05-10 VITALS
WEIGHT: 212.5 LBS | DIASTOLIC BLOOD PRESSURE: 80 MMHG | HEART RATE: 98 BPM | TEMPERATURE: 99 F | OXYGEN SATURATION: 97 % | HEIGHT: 66 IN | BODY MASS INDEX: 34.15 KG/M2 | SYSTOLIC BLOOD PRESSURE: 132 MMHG

## 2019-05-10 DIAGNOSIS — M54.50 LOW BACK PAIN, NON-SPECIFIC: ICD-10-CM

## 2019-05-10 DIAGNOSIS — M47.816 ARTHRITIS OF LUMBAR SPINE: ICD-10-CM
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
## 2019-05-10 PROCEDURE — 99214 PR OFFICE/OUTPT VISIT, EST, LEVL IV, 30-39 MIN: ICD-10-PCS | Mod: S$GLB,,, | Performed by: NURSE PRACTITIONER

## 2019-05-10 PROCEDURE — 72100 XR LUMBAR SPINE AP AND LATERAL: ICD-10-PCS | Mod: 26,,, | Performed by: RADIOLOGY

## 2019-05-10 PROCEDURE — 72100 X-RAY EXAM L-S SPINE 2/3 VWS: CPT | Mod: 26,,, | Performed by: RADIOLOGY

## 2019-05-10 PROCEDURE — 99999 PR PBB SHADOW E&M-EST. PATIENT-LVL IV: CPT | Mod: PBBFAC,,, | Performed by: NURSE PRACTITIONER

## 2019-05-10 PROCEDURE — 3008F PR BODY MASS INDEX (BMI) DOCUMENTED: ICD-10-PCS | Mod: CPTII,S$GLB,, | Performed by: NURSE PRACTITIONER

## 2019-05-10 PROCEDURE — 3008F BODY MASS INDEX DOCD: CPT | Mod: CPTII,S$GLB,, | Performed by: NURSE PRACTITIONER

## 2019-05-10 PROCEDURE — 3075F PR MOST RECENT SYSTOLIC BLOOD PRESS GE 130-139MM HG: ICD-10-PCS | Mod: CPTII,S$GLB,, | Performed by: NURSE PRACTITIONER

## 2019-05-10 PROCEDURE — 99999 PR PBB SHADOW E&M-EST. PATIENT-LVL IV: ICD-10-PCS | Mod: PBBFAC,,, | Performed by: NURSE PRACTITIONER

## 2019-05-10 PROCEDURE — 3075F SYST BP GE 130 - 139MM HG: CPT | Mod: CPTII,S$GLB,, | Performed by: NURSE PRACTITIONER

## 2019-05-10 PROCEDURE — 99214 OFFICE O/P EST MOD 30 MIN: CPT | Mod: S$GLB,,, | Performed by: NURSE PRACTITIONER

## 2019-05-10 PROCEDURE — 72100 X-RAY EXAM L-S SPINE 2/3 VWS: CPT | Mod: TC

## 2019-05-10 PROCEDURE — 3079F PR MOST RECENT DIASTOLIC BLOOD PRESSURE 80-89 MM HG: ICD-10-PCS | Mod: CPTII,S$GLB,, | Performed by: NURSE PRACTITIONER

## 2019-05-10 PROCEDURE — 3079F DIAST BP 80-89 MM HG: CPT | Mod: CPTII,S$GLB,, | Performed by: NURSE PRACTITIONER

## 2019-05-10 RX ORDER — PREDNISONE 20 MG/1
20 TABLET ORAL DAILY
Qty: 5 TABLET | Refills: 0 | Status: SHIPPED | OUTPATIENT
Start: 2019-05-10 | End: 2019-05-15

## 2019-05-10 RX ORDER — HYDROXYZINE HYDROCHLORIDE 10 MG/1
TABLET, FILM COATED ORAL
Refills: 5 | COMMUNITY
Start: 2019-04-27

## 2019-05-10 RX ORDER — MIRTAZAPINE 15 MG/1
TABLET, FILM COATED ORAL
Refills: 3 | COMMUNITY
Start: 2019-03-20

## 2019-05-10 RX ORDER — PANTOPRAZOLE SODIUM 20 MG/1
20 TABLET, DELAYED RELEASE ORAL DAILY
Refills: 0 | COMMUNITY
Start: 2019-04-08 | End: 2022-07-25

## 2019-05-10 RX ORDER — VENLAFAXINE HYDROCHLORIDE 150 MG/1
CAPSULE, EXTENDED RELEASE ORAL
Refills: 3 | COMMUNITY
Start: 2019-03-30

## 2019-05-10 RX ORDER — TIZANIDINE 4 MG/1
4 TABLET ORAL EVERY 8 HOURS PRN
Qty: 30 TABLET | Refills: 0 | Status: SHIPPED | OUTPATIENT
Start: 2019-05-10 | End: 2019-05-20

## 2019-05-10 RX ORDER — HYDROCHLOROTHIAZIDE 25 MG/1
25 TABLET ORAL DAILY
Refills: 3 | COMMUNITY
Start: 2019-04-22

## 2019-05-10 NOTE — PROGRESS NOTES
Subjective:       Patient ID: Ju Sinclair is a 59 y.o. female.    Chief Complaint: Leg Pain (x 2 days); Back Pain (x 2 days ); Fall (2 days ago); Neck Pain; and Hip Pain    Patient presents with back pain that started about 2 days ago.  Reports falling and hitting back.  Tried taking Tylenol.  No relief.  Has intermittent numbness and tingling to lower legs.      Leg Pain    The incident occurred 12 to 24 hours ago. The injury mechanism was a fall. The pain is at a severity of 10/10. The pain has been intermittent since onset. Associated symptoms include numbness. She reports no foreign bodies present. The symptoms are aggravated by movement, palpation and weight bearing.   Back Pain   This is a chronic problem. The current episode started in the past 7 days. The problem occurs constantly. The problem is unchanged. The pain is present in the lumbar spine. The quality of the pain is described as aching. The pain is at a severity of 10/10. The pain is severe. The symptoms are aggravated by bending and position. Associated symptoms include numbness.     Review of Systems   Constitutional: Negative for chills and fatigue.   Respiratory: Negative for cough and shortness of breath.    Musculoskeletal: Positive for arthralgias, back pain and myalgias.   Neurological: Positive for numbness.   Psychiatric/Behavioral: Negative for agitation and confusion.       Objective:      Physical Exam   Constitutional: She is oriented to person, place, and time. Vital signs are normal. She appears well-developed and well-nourished.   HENT:   Head: Normocephalic and atraumatic.   Neck: Normal range of motion.   Cardiovascular: Normal rate and regular rhythm.   Pulmonary/Chest: Effort normal and breath sounds normal.   Musculoskeletal: She exhibits tenderness.        Lumbar back: She exhibits decreased range of motion, tenderness and pain.   Neurological: She is alert and oriented to person, place, and time.   Skin: Skin is warm.    Psychiatric: She has a normal mood and affect. Her behavior is normal.       Assessment:       1. Fall, initial encounter    2. Arthritis of lumbar spine    3. Low back pain, non-specific        Plan:           Fall, initial encounter    Arthritis of lumbar spine  -     X-Ray Lumbar Spine AP And Lateral; Future; Expected date: 05/10/2019  -     tiZANidine (ZANAFLEX) 4 MG tablet; Take 1 tablet (4 mg total) by mouth every 8 (eight) hours as needed.  Dispense: 30 tablet; Refill: 0  -     predniSONE (DELTASONE) 20 MG tablet; Take 1 tablet (20 mg total) by mouth once daily. for 5 days  Dispense: 5 tablet; Refill: 0    Low back pain, non-specific  -     X-Ray Lumbar Spine AP And Lateral; Future; Expected date: 05/10/2019  -     tiZANidine (ZANAFLEX) 4 MG tablet; Take 1 tablet (4 mg total) by mouth every 8 (eight) hours as needed.  Dispense: 30 tablet; Refill: 0  -     predniSONE (DELTASONE) 20 MG tablet; Take 1 tablet (20 mg total) by mouth once daily. for 5 days  Dispense: 5 tablet; Refill: 0        Continue pain medications according to pain management provider.  Take tizanidine and prednisone as prescribed.  Follow up if no improvement.

## 2019-05-30 ENCOUNTER — TELEPHONE (OUTPATIENT)
Dept: OBSTETRICS AND GYNECOLOGY | Facility: CLINIC | Age: 59
End: 2019-05-30

## 2019-05-30 NOTE — TELEPHONE ENCOUNTER
----- Message from Vanita Quintero sent at 5/30/2019  2:49 PM CDT -----  Contact: Self   Patient requesting to have a [employer] scheduled. Please call patient back [phone]      Thanks,  Vanita Quintero

## 2019-05-30 NOTE — TELEPHONE ENCOUNTER
Spoke to patient and scheduled her appointment for 05/31/19 at 10:30am to see KING Clarke at the [place] location. Patient verbalized understanding.

## 2019-05-31 ENCOUNTER — OFFICE VISIT (OUTPATIENT)
Dept: OBSTETRICS AND GYNECOLOGY | Facility: CLINIC | Age: 59
End: 2019-05-31
Payer: MEDICARE

## 2019-05-31 VITALS
SYSTOLIC BLOOD PRESSURE: 122 MMHG | WEIGHT: 219.81 LBS | DIASTOLIC BLOOD PRESSURE: 80 MMHG | HEIGHT: 66 IN | BODY MASS INDEX: 35.32 KG/M2

## 2019-05-31 DIAGNOSIS — N89.8 VAGINAL ODOR: Primary | ICD-10-CM

## 2019-05-31 DIAGNOSIS — N95.2 VAGINAL ATROPHY: ICD-10-CM

## 2019-05-31 LAB
BACTERIAL VAGINOSIS DNA: NEGATIVE
CANDIDA GLABRATA DNA: NEGATIVE
CANDIDA KRUSEI DNA: NEGATIVE
CANDIDA RRNA VAG QL PROBE: NEGATIVE
T VAGINALIS RRNA GENITAL QL PROBE: NEGATIVE

## 2019-05-31 PROCEDURE — 3079F DIAST BP 80-89 MM HG: CPT | Mod: CPTII,S$GLB,, | Performed by: NURSE PRACTITIONER

## 2019-05-31 PROCEDURE — 3008F PR BODY MASS INDEX (BMI) DOCUMENTED: ICD-10-PCS | Mod: CPTII,S$GLB,, | Performed by: NURSE PRACTITIONER

## 2019-05-31 PROCEDURE — 3074F SYST BP LT 130 MM HG: CPT | Mod: CPTII,S$GLB,, | Performed by: NURSE PRACTITIONER

## 2019-05-31 PROCEDURE — 3008F BODY MASS INDEX DOCD: CPT | Mod: CPTII,S$GLB,, | Performed by: NURSE PRACTITIONER

## 2019-05-31 PROCEDURE — 3079F PR MOST RECENT DIASTOLIC BLOOD PRESSURE 80-89 MM HG: ICD-10-PCS | Mod: CPTII,S$GLB,, | Performed by: NURSE PRACTITIONER

## 2019-05-31 PROCEDURE — 99214 OFFICE O/P EST MOD 30 MIN: CPT | Mod: S$GLB,,, | Performed by: NURSE PRACTITIONER

## 2019-05-31 PROCEDURE — 99999 PR PBB SHADOW E&M-EST. PATIENT-LVL III: CPT | Mod: PBBFAC,,, | Performed by: NURSE PRACTITIONER

## 2019-05-31 PROCEDURE — 3074F PR MOST RECENT SYSTOLIC BLOOD PRESSURE < 130 MM HG: ICD-10-PCS | Mod: CPTII,S$GLB,, | Performed by: NURSE PRACTITIONER

## 2019-05-31 PROCEDURE — 99214 PR OFFICE/OUTPT VISIT, EST, LEVL IV, 30-39 MIN: ICD-10-PCS | Mod: S$GLB,,, | Performed by: NURSE PRACTITIONER

## 2019-05-31 PROCEDURE — 99999 PR PBB SHADOW E&M-EST. PATIENT-LVL III: ICD-10-PCS | Mod: PBBFAC,,, | Performed by: NURSE PRACTITIONER

## 2019-05-31 PROCEDURE — 87510 GARDNER VAG DNA DIR PROBE: CPT

## 2019-05-31 PROCEDURE — 87480 CANDIDA DNA DIR PROBE: CPT

## 2019-05-31 RX ORDER — ESTRADIOL 0.1 MG/G
1 CREAM VAGINAL
Qty: 42.5 G | Refills: 6 | Status: SHIPPED | OUTPATIENT
Start: 2019-05-31 | End: 2020-07-07 | Stop reason: SDUPTHER

## 2019-05-31 NOTE — PROGRESS NOTES
Ju Sinclair is a 59 y.o. female  presents for too soon for annual per medicare, having vaginal odor and dryness, not using her estrogen cream as prescribed,  Had mmg at [facility].  Having vaginal odor  .  LMP: No LMP recorded. Patient is postmenopausal..      Past Medical History:   Diagnosis Date    Anxiety disorder     Breast disorder     Hyperlipidemia 2018    Hypertension     Mental disorder     Neuropathy     Sleep apnea      Past Surgical History:   Procedure Laterality Date     SECTION      SHOULDER SURGERY      TONSILLECTOMY       Social History     Socioeconomic History    Marital status:      Spouse name: Not on file    Number of children: Not on file    Years of education: Not on file    Highest education level: Not on file   Occupational History    Not on file   Social Needs    Financial resource strain: Not on file    Food insecurity:     Worry: Not on file     Inability: Not on file    Transportation needs:     Medical: Not on file     Non-medical: Not on file   Tobacco Use    Smoking status: Never Smoker    Smokeless tobacco: Never Used   Substance and Sexual Activity    Alcohol use: No     Alcohol/week: 0.0 oz    Drug use: No    Sexual activity: Not Currently     Partners: Male     Birth control/protection: None   Lifestyle    Physical activity:     Days per week: Not on file     Minutes per session: Not on file    Stress: Not on file   Relationships    Social connections:     Talks on phone: Not on file     Gets together: Not on file     Attends Baptism service: Not on file     Active member of club or organization: Not on file     Attends meetings of clubs or organizations: Not on file     Relationship status: Not on file   Other Topics Concern    Not on file   Social History Narrative    Not on file     Family History   Problem Relation Age of Onset    Eclampsia Paternal Grandmother     Diabetes Paternal Grandmother     Breast cancer  "Sister     Colon cancer Maternal Grandfather     Ovarian cancer Neg Hx     Cancer Neg Hx     Hypertension Neg Hx     Miscarriages / Stillbirths Neg Hx      labor Neg Hx     Stroke Neg Hx      OB History        2    Para   2    Term   2            AB        Living   2       SAB        TAB        Ectopic        Multiple        Live Births   2                 /80   Ht 5' 6" (1.676 m)   Wt 99.7 kg (219 lb 12.8 oz)   BMI 35.48 kg/m²       ROS:  Per hpi    PHYSICAL EXAM:  APPEARANCE: Well nourished, well developed, in no acute distress.  AFFECT: WNL, alert and oriented x 3  PELVIC: Normal external genitalia without lesions.  Normal hair distribution.  Adequate perineal body, normal urethral meatus.  Vagina atrophic without lesions or discharge.  Cervix pink, without lesions, discharge or tenderness.  No significant cystocele or rectocele.  Bimanual exam shows uterus to be enlarged size, regular, mobile and nontender.  Adnexa without masses or tenderness.    EXTREMITIES: No edema.  Physical Exam    1. Vaginal odor  Vaginosis Screen by DNA Probe    estradiol (ESTRACE) 0.01 % (0.1 mg/gram) vaginal cream   2. Vaginal atrophy  estradiol (ESTRACE) 0.01 % (0.1 mg/gram) vaginal cream    AND PLAN:    Patient was counseled today on A.C.S. Pap guidelines and recommendations for yearly pelvic exams, mammograms and monthly self breast exams; to see her PCP for other health maintenance.                   "

## 2019-06-03 ENCOUNTER — PATIENT MESSAGE (OUTPATIENT)
Dept: OBSTETRICS AND GYNECOLOGY | Facility: CLINIC | Age: 59
End: 2019-06-03

## 2020-07-07 ENCOUNTER — HOSPITAL ENCOUNTER (OUTPATIENT)
Dept: RADIOLOGY | Facility: HOSPITAL | Age: 60
Discharge: HOME | End: 2020-07-07
Attending: FAMILY MEDICINE
Payer: MEDICARE

## 2020-07-07 VITALS — WEIGHT: 219.81 LBS | BODY MASS INDEX: 35.32 KG/M2 | HEIGHT: 66 IN

## 2020-07-07 DIAGNOSIS — N95.2 VAGINAL ATROPHY: ICD-10-CM

## 2020-07-07 DIAGNOSIS — N89.8 VAGINAL ODOR: ICD-10-CM

## 2020-07-07 DIAGNOSIS — Z12.31 ENCOUNTER FOR SCREENING MAMMOGRAM FOR MALIGNANT NEOPLASM OF BREAST: ICD-10-CM

## 2020-07-07 PROCEDURE — 77067 SCR MAMMO BI INCL CAD: CPT | Mod: 26,,, | Performed by: RADIOLOGY

## 2020-07-07 PROCEDURE — 77063 BREAST TOMOSYNTHESIS BI: CPT | Mod: 26,,, | Performed by: RADIOLOGY

## 2020-07-07 PROCEDURE — 77067 SCR MAMMO BI INCL CAD: CPT | Mod: TC

## 2020-07-07 PROCEDURE — 77063 MAMMO DIGITAL SCREENING BILAT WITH TOMOSYNTHESIS_CAD: ICD-10-PCS | Mod: 26,,, | Performed by: RADIOLOGY

## 2020-07-07 PROCEDURE — 77067 MAMMO DIGITAL SCREENING BILAT WITH TOMOSYNTHESIS_CAD: ICD-10-PCS | Mod: 26,,, | Performed by: RADIOLOGY

## 2020-07-07 RX ORDER — ESTRADIOL 0.1 MG/G
1 CREAM VAGINAL
Qty: 42.5 G | Refills: 6 | Status: SHIPPED | OUTPATIENT
Start: 2020-07-08 | End: 2021-06-01 | Stop reason: SDUPTHER

## 2020-07-11 NOTE — TELEPHONE ENCOUNTER
Apparently he is not in network for her - will not let me put in the referral, she will have to see someone else - she can try at [facility]    [de-identified] : Plan:\par #1. Physical therapy prescription given for both ankles and feet.\par #2. Proper supportive shoe wear/dorsal night splinting.\par #3. Dr. Rose's gel heel cups versus arch supports/Epsom salt baths. \par #4. Continue aspirin for pain.\par #5. Hold off on advanced imaging at this time.\par #6. Followup in 6-8 weeks if pain continues. All of her questions were answered.

## 2020-11-02 NOTE — PATIENT INSTRUCTIONS
"  Bladder Infection, Female (Adult)    Urine is normally doesn't have any bacteria in it. But bacteria can get into the urinary tract from the skin around the rectum. Or they can travel in the blood from elsewhere in the body. Once they are in your urinary tract, they can cause infection in the urethra (urethritis), the bladder (cystitis), or the kidneys (pyelonephritis).  The most common place for an infection is in the bladder. This is called a bladder infection. This is one of the most common infections in women. Most bladder infections are easily treated. They are not serious unless the infection spreads to the kidney.  The phrases "bladder infection," "UTI," and "cystitis" are often used to describe the same thing. But they are not always the same. Cystitis is an inflammation of the bladder. The most common cause of cystitis is an infection.  Symptoms  The infection causes inflammation in the urethra and bladder. This causes many of the symptoms. The most common symptoms of a bladder infection are:  · Pain or burning when urinating  · Having to urinate more often than usual  · Urgent need to urinate  · Only a small amount of urine comes out  · Blood in urine  · Abdominal discomfort. This is usually in the lower abdomen above the pubic bone.  · Cloudy urine  · Strong- or bad-smelling urine  · Unable to urinate (urinary retention)  · Unable to hold urine in (urinary incontinence)  · Fever  · Loss of appetite  · Confusion (in older adults)  Causes  Bladder infections are not contagious. You can't get one from someone else, from a toilet seat, or from sharing a bath.  The most common cause of bladder infections is bacteria from the bowels. The bacteria get onto the skin around the opening of the urethra. From there, they can get into the urine and travel up to the bladder, causing inflammation and infection. This usually happens because of:  · Wiping improperly after urinating. Always wipe from front to " back.  · Bowel incontinence  · Pregnancy  · Procedures such as having a catheter inserted  · Older age  · Not emptying your bladder. This can allow bacteria a chance to grow in your urine.  · Dehydration  · Constipation  · Sex  · Use of a diaphragm for birth control   Treatment  Bladder infections are diagnosed by a urine test. They are treated with antibiotics and usually clear up quickly without complications. Treatment helps prevent a more serious kidney infection.  Medicines  Medicines can help in the treatment of a bladder infection:  · Take antibiotics until they are used up, even if you feel better. It is important to finish them to make sure the infection has cleared.  · You can use acetaminophen or ibuprofen for pain, fever, or discomfort, unless another medicine was prescribed. If you have chronic liver or kidney disease, talk with your healthcare provider before using these medicines. Also talk with your provider if you've ever had a stomach ulcer or gastrointestinal bleeding, or are taking blood-thinner medicines.  · If you are given phenazopydridine to reduce burning with urination, it will cause your urine to become a bright orange color. This can stain clothing.  Care and prevention  These self-care steps can help prevent future infections:  · Drink plenty of fluids to prevent dehydration and flush out your bladder. Do this unless you must restrict fluids for other health reasons, or your doctor told you not to.  · Proper cleaning after going to the bathroom is important. Wipe from front to back after using the toilet to prevent the spread of bacteria.  · Urinate more often. Don't try to hold urine in for a long time.  · Wear loose-fitting clothes and cotton underwear. Avoid tight-fitting pants.  · Improve your diet and prevent constipation. Eat more fresh fruit and vegetables, and fiber, and less junk and fatty foods.  · Avoid sex until your symptoms are gone.  · Avoid caffeine, alcohol, and spicy  foods. These can irritate your bladder.  · Urinate right after intercourse to flush out your bladder.  · If you use birth control pills and have frequent bladder infections, discuss it with your doctor.  Follow-up care  Call your healthcare provider if all symptoms are not gone after 3 days of treatment. This is especially important if you have repeat infections.  If a culture was done, you will be told if your treatment needs to be changed. If directed, you can call to find out the results.  If X-rays were done, you will be told if the results will affect your treatment.  Call 911  Call 911 if any of the following occur:  · Trouble breathing  · Hard to wake up or confusion  · Fainting or loss of consciousness  · Rapid heart rate  When to seek medical advice  Call your healthcare provider right away if any of these occur:  · Fever of 100.4ºF (38.0ºC) or higher, or as directed by your healthcare provider  · Symptoms are not better by the third day of treatment  · Back or belly (abdominal) pain that gets worse  · Repeated vomiting, or unable to keep medicine down  · Weakness or dizziness  · Vaginal discharge  · Pain, redness, or swelling in the outer vaginal area (labia)  Date Last Reviewed: 10/1/2016  © 3125-7909 [employer]. [address] [place] [address], [place] [address]. All rights reserved. This information is not intended as a substitute for professional medical care. Always follow your healthcare professional's instructions.        Pelvic Pain, Uncertain Cause    Pelvic pain is pain felt in the lowest part of the belly (abdomen) and between the hipbones. The pain may be acute. This means it occurred suddenly and recently. Or the pain may be chronic. This means it has occurred for 6 months or longer.  There are many possible causes of pelvic pain. The pain may be due to a problem in the female reproductive system (pictured here). Or, it may be due to a problem in the digestive, urinary, or  musculoskeletal systems.  Based on your visit today, the exact cause of your pelvic pain is not certain. Your condition does not appear to be serious at this time. But it is important for you to keep watching for any new symptoms or worsening of your condition.  General care  Your healthcare provider may advise a number of ways to help manage your pain. These can include:  · Taking over-the-counter pain medicine. Stronger pain medicine may also be prescribed, if needed.  · Applying heat to the pelvic area. Use a heating pad or a hot pack. Taking a hot bath may also help.  · Getting plenty of rest.  · Making certain lifestyle changes. These can include practicing good posture and getting regular exercise. (Studies have shown that these changes help reduce pelvic pain in some women.)  · Seeing a physical therapist or pain specialist. These healthcare providers can discuss other ways to manage pain with you.  Follow-up care  Follow up with your healthcare provider, or as advised.   When to seek medical advice  Call your healthcare provider right away if any of the following occur:  · Fever of 100.4°F or higher, or as directed by your healthcare provider  · Pain worsens or you have sudden, severe pain or new pain  · Nausea, vomiting, sweating, or restlessness  · Dizziness or fainting  · Unusual vaginal discharge  · Abnormal vaginal bleeding (especially bleeding after menopause)  Date Last Reviewed: 6/11/2015  © 5638-3797 [employer]. [address] [place] [address], [place] [address]. All rights reserved. This information is not intended as a substitute for professional medical care. Always follow your healthcare professional's instructions.         Good

## 2021-04-02 ENCOUNTER — IMMUNIZATION (OUTPATIENT)
Dept: INTERNAL MEDICINE | Facility: CLINIC | Age: 61
End: 2021-04-02
Payer: MEDICARE

## 2021-04-02 DIAGNOSIS — Z23 NEED FOR VACCINATION: Primary | ICD-10-CM

## 2021-04-02 PROCEDURE — 91300 COVID-19, MRNA, LNP-S, PF, 30 MCG/0.3 ML DOSE VACCINE: CPT | Mod: S$GLB,,, | Performed by: FAMILY MEDICINE

## 2021-04-02 PROCEDURE — 91300 COVID-19, MRNA, LNP-S, PF, 30 MCG/0.3 ML DOSE VACCINE: ICD-10-PCS | Mod: S$GLB,,, | Performed by: FAMILY MEDICINE

## 2021-04-02 PROCEDURE — 0001A COVID-19, MRNA, LNP-S, PF, 30 MCG/0.3 ML DOSE VACCINE: ICD-10-PCS | Mod: CV19,S$GLB,, | Performed by: FAMILY MEDICINE

## 2021-04-02 PROCEDURE — 0001A COVID-19, MRNA, LNP-S, PF, 30 MCG/0.3 ML DOSE VACCINE: CPT | Mod: CV19,S$GLB,, | Performed by: FAMILY MEDICINE

## 2021-04-23 ENCOUNTER — IMMUNIZATION (OUTPATIENT)
Dept: INTERNAL MEDICINE | Facility: CLINIC | Age: 61
End: 2021-04-23
Payer: MEDICARE

## 2021-04-23 DIAGNOSIS — Z23 NEED FOR VACCINATION: Primary | ICD-10-CM

## 2021-04-23 PROCEDURE — 0002A COVID-19, MRNA, LNP-S, PF, 30 MCG/0.3 ML DOSE VACCINE: CPT | Mod: CV19,,, | Performed by: FAMILY MEDICINE

## 2021-04-23 PROCEDURE — 0002A COVID-19, MRNA, LNP-S, PF, 30 MCG/0.3 ML DOSE VACCINE: ICD-10-PCS | Mod: CV19,,, | Performed by: FAMILY MEDICINE

## 2021-04-23 PROCEDURE — 91300 COVID-19, MRNA, LNP-S, PF, 30 MCG/0.3 ML DOSE VACCINE: ICD-10-PCS | Mod: ,,, | Performed by: FAMILY MEDICINE

## 2021-04-23 PROCEDURE — 91300 COVID-19, MRNA, LNP-S, PF, 30 MCG/0.3 ML DOSE VACCINE: CPT | Mod: ,,, | Performed by: FAMILY MEDICINE

## 2021-05-06 ENCOUNTER — OFFICE VISIT (OUTPATIENT)
Dept: OPHTHALMOLOGY | Facility: CLINIC | Age: 61
End: 2021-05-06
Payer: MEDICARE

## 2021-05-06 DIAGNOSIS — H25.012 CORTICAL AGE-RELATED CATARACT OF LEFT EYE: Primary | ICD-10-CM

## 2021-05-06 DIAGNOSIS — H52.13 MYOPIA, BILATERAL: ICD-10-CM

## 2021-05-06 DIAGNOSIS — H52.4 PRESBYOPIA: ICD-10-CM

## 2021-05-06 PROCEDURE — 99999 PR PBB SHADOW E&M-EST. PATIENT-LVL II: ICD-10-PCS | Mod: PBBFAC,,, | Performed by: OPTOMETRIST

## 2021-05-06 PROCEDURE — 92014 COMPRE OPH EXAM EST PT 1/>: CPT | Mod: S$GLB,,, | Performed by: OPTOMETRIST

## 2021-05-06 PROCEDURE — 92015 DETERMINE REFRACTIVE STATE: CPT | Mod: S$GLB,,, | Performed by: OPTOMETRIST

## 2021-05-06 PROCEDURE — 99212 OFFICE O/P EST SF 10 MIN: CPT | Mod: PBBFAC | Performed by: OPTOMETRIST

## 2021-05-06 PROCEDURE — 99999 PR PBB SHADOW E&M-EST. PATIENT-LVL II: CPT | Mod: PBBFAC,,, | Performed by: OPTOMETRIST

## 2021-05-06 PROCEDURE — 92015 PR REFRACTION: ICD-10-PCS | Mod: S$GLB,,, | Performed by: OPTOMETRIST

## 2021-05-06 PROCEDURE — 92014 PR EYE EXAM, EST PATIENT,COMPREHESV: ICD-10-PCS | Mod: S$GLB,,, | Performed by: OPTOMETRIST

## 2021-05-06 RX ORDER — LOSARTAN POTASSIUM 50 MG/1
50 TABLET ORAL DAILY
COMMUNITY
Start: 2021-04-05

## 2021-06-01 ENCOUNTER — TELEPHONE (OUTPATIENT)
Dept: RADIOLOGY | Facility: HOSPITAL | Age: 61
End: 2021-06-01

## 2021-06-01 ENCOUNTER — TELEPHONE (OUTPATIENT)
Dept: OBSTETRICS AND GYNECOLOGY | Facility: CLINIC | Age: 61
End: 2021-06-01

## 2021-06-01 ENCOUNTER — OFFICE VISIT (OUTPATIENT)
Dept: OBSTETRICS AND GYNECOLOGY | Facility: CLINIC | Age: 61
End: 2021-06-01
Payer: MEDICARE

## 2021-06-01 ENCOUNTER — LAB VISIT (OUTPATIENT)
Dept: LAB | Facility: HOSPITAL | Age: 61
End: 2021-06-01
Attending: NURSE PRACTITIONER
Payer: MEDICARE

## 2021-06-01 VITALS
WEIGHT: 211 LBS | DIASTOLIC BLOOD PRESSURE: 88 MMHG | HEIGHT: 66 IN | BODY MASS INDEX: 33.91 KG/M2 | SYSTOLIC BLOOD PRESSURE: 152 MMHG

## 2021-06-01 DIAGNOSIS — Z01.419 ENCOUNTER FOR GYNECOLOGICAL EXAMINATION WITHOUT ABNORMAL FINDING: Primary | ICD-10-CM

## 2021-06-01 DIAGNOSIS — R30.0 DYSURIA: ICD-10-CM

## 2021-06-01 DIAGNOSIS — R31.9 HEMATURIA, UNSPECIFIED TYPE: ICD-10-CM

## 2021-06-01 DIAGNOSIS — N95.2 VAGINAL ATROPHY: ICD-10-CM

## 2021-06-01 DIAGNOSIS — D21.9 FIBROIDS: ICD-10-CM

## 2021-06-01 DIAGNOSIS — Z12.31 ENCOUNTER FOR SCREENING MAMMOGRAM FOR BREAST CANCER: ICD-10-CM

## 2021-06-01 DIAGNOSIS — R10.2 PELVIC PAIN IN FEMALE: ICD-10-CM

## 2021-06-01 DIAGNOSIS — N89.8 VAGINAL ODOR: ICD-10-CM

## 2021-06-01 LAB
BASOPHILS # BLD AUTO: 0.03 K/UL (ref 0–0.2)
BASOPHILS NFR BLD: 0.5 % (ref 0–1.9)
BILIRUB UR QL STRIP: NEGATIVE
CLARITY UR: CLEAR
COLOR UR: ABNORMAL
DIFFERENTIAL METHOD: ABNORMAL
EOSINOPHIL # BLD AUTO: 0.2 K/UL (ref 0–0.5)
EOSINOPHIL NFR BLD: 2.9 % (ref 0–8)
ERYTHROCYTE [DISTWIDTH] IN BLOOD BY AUTOMATED COUNT: 13.3 % (ref 11.5–14.5)
GLUCOSE UR QL STRIP: NEGATIVE
HCT VFR BLD AUTO: 37 % (ref 37–48.5)
HGB BLD-MCNC: 11.9 G/DL (ref 12–16)
HGB UR QL STRIP: ABNORMAL
IMM GRANULOCYTES # BLD AUTO: 0.02 K/UL (ref 0–0.04)
IMM GRANULOCYTES NFR BLD AUTO: 0.4 % (ref 0–0.5)
KETONES UR QL STRIP: NEGATIVE
LEUKOCYTE ESTERASE UR QL STRIP: NEGATIVE
LYMPHOCYTES # BLD AUTO: 2.3 K/UL (ref 1–4.8)
LYMPHOCYTES NFR BLD: 42.7 % (ref 18–48)
MCH RBC QN AUTO: 30.1 PG (ref 27–31)
MCHC RBC AUTO-ENTMCNC: 32.2 G/DL (ref 32–36)
MCV RBC AUTO: 94 FL (ref 82–98)
MICROSCOPIC COMMENT: NORMAL
MONOCYTES # BLD AUTO: 0.4 K/UL (ref 0.3–1)
MONOCYTES NFR BLD: 7.5 % (ref 4–15)
NEUTROPHILS # BLD AUTO: 2.5 K/UL (ref 1.8–7.7)
NEUTROPHILS NFR BLD: 46 % (ref 38–73)
NITRITE UR QL STRIP: NEGATIVE
NRBC BLD-RTO: 0 /100 WBC
PH UR STRIP: 6 [PH] (ref 5–8)
PLATELET # BLD AUTO: 222 K/UL (ref 150–450)
PMV BLD AUTO: 12.1 FL (ref 9.2–12.9)
PROT UR QL STRIP: ABNORMAL
RBC # BLD AUTO: 3.95 M/UL (ref 4–5.4)
RBC #/AREA URNS HPF: 0 /HPF (ref 0–4)
SP GR UR STRIP: 1.01 (ref 1–1.03)
SQUAMOUS #/AREA URNS HPF: 2 /HPF
URN SPEC COLLECT METH UR: ABNORMAL
WBC # BLD AUTO: 5.46 K/UL (ref 3.9–12.7)

## 2021-06-01 PROCEDURE — 87481 CANDIDA DNA AMP PROBE: CPT | Mod: 59 | Performed by: NURSE PRACTITIONER

## 2021-06-01 PROCEDURE — 87624 HPV HI-RISK TYP POOLED RSLT: CPT | Performed by: NURSE PRACTITIONER

## 2021-06-01 PROCEDURE — 99396 PR PREVENTIVE VISIT,EST,40-64: ICD-10-PCS | Mod: 25,S$GLB,, | Performed by: NURSE PRACTITIONER

## 2021-06-01 PROCEDURE — 81002 URINALYSIS NONAUTO W/O SCOPE: CPT | Mod: S$GLB,,, | Performed by: NURSE PRACTITIONER

## 2021-06-01 PROCEDURE — 88175 CYTOPATH C/V AUTO FLUID REDO: CPT | Performed by: NURSE PRACTITIONER

## 2021-06-01 PROCEDURE — 85025 COMPLETE CBC W/AUTO DIFF WBC: CPT | Performed by: NURSE PRACTITIONER

## 2021-06-01 PROCEDURE — 3008F PR BODY MASS INDEX (BMI) DOCUMENTED: ICD-10-PCS | Mod: CPTII,S$GLB,, | Performed by: NURSE PRACTITIONER

## 2021-06-01 PROCEDURE — 99999 PR PBB SHADOW E&M-EST. PATIENT-LVL IV: ICD-10-PCS | Mod: PBBFAC,,, | Performed by: NURSE PRACTITIONER

## 2021-06-01 PROCEDURE — 81000 URINALYSIS NONAUTO W/SCOPE: CPT | Performed by: NURSE PRACTITIONER

## 2021-06-01 PROCEDURE — 3008F BODY MASS INDEX DOCD: CPT | Mod: CPTII,S$GLB,, | Performed by: NURSE PRACTITIONER

## 2021-06-01 PROCEDURE — 99396 PREV VISIT EST AGE 40-64: CPT | Mod: 25,S$GLB,, | Performed by: NURSE PRACTITIONER

## 2021-06-01 PROCEDURE — 99999 PR PBB SHADOW E&M-EST. PATIENT-LVL IV: CPT | Mod: PBBFAC,,, | Performed by: NURSE PRACTITIONER

## 2021-06-01 PROCEDURE — 87661 TRICHOMONAS VAGINALIS AMPLIF: CPT | Mod: 59 | Performed by: NURSE PRACTITIONER

## 2021-06-01 PROCEDURE — 87086 URINE CULTURE/COLONY COUNT: CPT | Performed by: NURSE PRACTITIONER

## 2021-06-01 PROCEDURE — 81002 PR URINALYSIS NONAUTO W/O SCOPE: ICD-10-PCS | Mod: S$GLB,,, | Performed by: NURSE PRACTITIONER

## 2021-06-01 PROCEDURE — 36415 COLL VENOUS BLD VENIPUNCTURE: CPT | Performed by: NURSE PRACTITIONER

## 2021-06-01 RX ORDER — NITROFURANTOIN 25; 75 MG/1; MG/1
100 CAPSULE ORAL 2 TIMES DAILY
Qty: 14 CAPSULE | Refills: 0 | Status: SHIPPED | OUTPATIENT
Start: 2021-06-01 | End: 2021-06-08

## 2021-06-01 RX ORDER — ESTRADIOL 0.1 MG/G
0.5 CREAM VAGINAL
Qty: 42.5 G | Refills: 6 | Status: SHIPPED | OUTPATIENT
Start: 2021-06-02 | End: 2022-07-25 | Stop reason: SDUPTHER

## 2021-06-02 ENCOUNTER — TELEPHONE (OUTPATIENT)
Dept: OBSTETRICS AND GYNECOLOGY | Facility: CLINIC | Age: 61
End: 2021-06-02

## 2021-06-03 ENCOUNTER — TELEPHONE (OUTPATIENT)
Dept: OBSTETRICS AND GYNECOLOGY | Facility: CLINIC | Age: 61
End: 2021-06-03

## 2021-06-03 ENCOUNTER — PATIENT MESSAGE (OUTPATIENT)
Dept: OBSTETRICS AND GYNECOLOGY | Facility: CLINIC | Age: 61
End: 2021-06-03

## 2021-06-03 LAB
BACTERIA UR CULT: NO GROWTH
BACTERIAL VAGINOSIS DNA: NEGATIVE
CANDIDA GLABRATA DNA: NEGATIVE
CANDIDA KRUSEI DNA: NEGATIVE
CANDIDA RRNA VAG QL PROBE: NEGATIVE
T VAGINALIS RRNA GENITAL QL PROBE: NEGATIVE

## 2021-06-04 LAB
FINAL PATHOLOGIC DIAGNOSIS: NORMAL
Lab: NORMAL

## 2021-06-08 LAB
HPV HR 12 DNA SPEC QL NAA+PROBE: NEGATIVE
HPV16 AG SPEC QL: NEGATIVE
HPV18 DNA SPEC QL NAA+PROBE: NEGATIVE

## 2021-06-09 ENCOUNTER — PATIENT MESSAGE (OUTPATIENT)
Dept: OBSTETRICS AND GYNECOLOGY | Facility: CLINIC | Age: 61
End: 2021-06-09

## 2021-07-12 ENCOUNTER — HOSPITAL ENCOUNTER (OUTPATIENT)
Dept: RADIOLOGY | Facility: HOSPITAL | Age: 61
Discharge: HOME | End: 2021-07-12
Attending: NURSE PRACTITIONER
Payer: MEDICARE

## 2021-07-12 VITALS — WEIGHT: 211 LBS | BODY MASS INDEX: 33.91 KG/M2 | HEIGHT: 66 IN

## 2021-07-12 DIAGNOSIS — Z12.31 ENCOUNTER FOR SCREENING MAMMOGRAM FOR BREAST CANCER: ICD-10-CM

## 2021-07-12 PROCEDURE — 77063 BREAST TOMOSYNTHESIS BI: CPT | Mod: 26,,, | Performed by: RADIOLOGY

## 2021-07-12 PROCEDURE — 77067 MAMMO DIGITAL SCREENING BILAT WITH TOMO: ICD-10-PCS | Mod: 26,,, | Performed by: RADIOLOGY

## 2021-07-12 PROCEDURE — 77067 SCR MAMMO BI INCL CAD: CPT | Mod: 26,,, | Performed by: RADIOLOGY

## 2021-07-12 PROCEDURE — 77067 SCR MAMMO BI INCL CAD: CPT | Mod: TC

## 2021-07-12 PROCEDURE — 77063 MAMMO DIGITAL SCREENING BILAT WITH TOMO: ICD-10-PCS | Mod: 26,,, | Performed by: RADIOLOGY

## 2021-07-14 ENCOUNTER — HOSPITAL ENCOUNTER (OUTPATIENT)
Dept: RADIOLOGY | Facility: HOSPITAL | Age: 61
Discharge: HOME | End: 2021-07-14
Attending: NURSE PRACTITIONER
Payer: MEDICARE

## 2021-07-14 DIAGNOSIS — R10.2 PELVIC PAIN IN FEMALE: ICD-10-CM

## 2021-07-14 PROCEDURE — 76856 US PELVIS COMP WITH TRANSVAG NON-OB (XPD): ICD-10-PCS | Mod: 26,,, | Performed by: RADIOLOGY

## 2021-07-14 PROCEDURE — 76856 US EXAM PELVIC COMPLETE: CPT | Mod: TC

## 2021-07-14 PROCEDURE — 76830 US PELVIS COMP WITH TRANSVAG NON-OB (XPD): ICD-10-PCS | Mod: 26,,, | Performed by: RADIOLOGY

## 2021-07-14 PROCEDURE — 76856 US EXAM PELVIC COMPLETE: CPT | Mod: 26,,, | Performed by: RADIOLOGY

## 2021-07-14 PROCEDURE — 76830 TRANSVAGINAL US NON-OB: CPT | Mod: 26,,, | Performed by: RADIOLOGY

## 2021-08-03 ENCOUNTER — OFFICE VISIT (OUTPATIENT)
Dept: PODIATRY | Facility: CLINIC | Age: 61
End: 2021-08-03
Payer: MEDICARE

## 2021-08-03 ENCOUNTER — HOSPITAL ENCOUNTER (OUTPATIENT)
Dept: RADIOLOGY | Facility: HOSPITAL | Age: 61
Discharge: HOME | End: 2021-08-03
Attending: PODIATRIST
Payer: MEDICARE

## 2021-08-03 VITALS — BODY MASS INDEX: 33.91 KG/M2 | HEIGHT: 66 IN | WEIGHT: 211 LBS

## 2021-08-03 DIAGNOSIS — M79.674 TOE PAIN, RIGHT: ICD-10-CM

## 2021-08-03 DIAGNOSIS — M79.89 PAIN AND SWELLING OF LEFT LOWER LEG: Primary | ICD-10-CM

## 2021-08-03 DIAGNOSIS — M79.604 PAIN AND SWELLING OF RIGHT LOWER EXTREMITY: ICD-10-CM

## 2021-08-03 DIAGNOSIS — M79.672 BILATERAL FOOT PAIN: ICD-10-CM

## 2021-08-03 DIAGNOSIS — M79.671 BILATERAL FOOT PAIN: ICD-10-CM

## 2021-08-03 DIAGNOSIS — M79.662 PAIN AND SWELLING OF LEFT LOWER LEG: Primary | ICD-10-CM

## 2021-08-03 DIAGNOSIS — M79.89 PAIN AND SWELLING OF RIGHT LOWER EXTREMITY: ICD-10-CM

## 2021-08-03 DIAGNOSIS — I89.0 LYMPHEDEMA: ICD-10-CM

## 2021-08-03 PROCEDURE — 3008F BODY MASS INDEX DOCD: CPT | Mod: CPTII,S$GLB,, | Performed by: PODIATRIST

## 2021-08-03 PROCEDURE — 1159F MED LIST DOCD IN RCRD: CPT | Mod: CPTII,S$GLB,, | Performed by: PODIATRIST

## 2021-08-03 PROCEDURE — 1125F PR PAIN SEVERITY QUANTIFIED, PAIN PRESENT: ICD-10-PCS | Mod: CPTII,S$GLB,, | Performed by: PODIATRIST

## 2021-08-03 PROCEDURE — 73630 X-RAY EXAM OF FOOT: CPT | Mod: 26,RT,, | Performed by: RADIOLOGY

## 2021-08-03 PROCEDURE — 99204 PR OFFICE/OUTPT VISIT, NEW, LEVL IV, 45-59 MIN: ICD-10-PCS | Mod: S$GLB,,, | Performed by: PODIATRIST

## 2021-08-03 PROCEDURE — 73630 X-RAY EXAM OF FOOT: CPT | Mod: TC,RT

## 2021-08-03 PROCEDURE — 1159F PR MEDICATION LIST DOCUMENTED IN MEDICAL RECORD: ICD-10-PCS | Mod: CPTII,S$GLB,, | Performed by: PODIATRIST

## 2021-08-03 PROCEDURE — 99999 PR PBB SHADOW E&M-EST. PATIENT-LVL IV: CPT | Mod: PBBFAC,,, | Performed by: PODIATRIST

## 2021-08-03 PROCEDURE — 1125F AMNT PAIN NOTED PAIN PRSNT: CPT | Mod: CPTII,S$GLB,, | Performed by: PODIATRIST

## 2021-08-03 PROCEDURE — 99204 OFFICE O/P NEW MOD 45 MIN: CPT | Mod: S$GLB,,, | Performed by: PODIATRIST

## 2021-08-03 PROCEDURE — 73630 XR FOOT COMPLETE 3 VIEW RIGHT: ICD-10-PCS | Mod: 26,RT,, | Performed by: RADIOLOGY

## 2021-08-03 PROCEDURE — 99999 PR PBB SHADOW E&M-EST. PATIENT-LVL IV: ICD-10-PCS | Mod: PBBFAC,,, | Performed by: PODIATRIST

## 2021-08-03 PROCEDURE — 3008F PR BODY MASS INDEX (BMI) DOCUMENTED: ICD-10-PCS | Mod: CPTII,S$GLB,, | Performed by: PODIATRIST

## 2021-08-06 ENCOUNTER — CLINICAL SUPPORT (OUTPATIENT)
Dept: REHABILITATION | Facility: HOSPITAL | Age: 61
End: 2021-08-06
Attending: PODIATRIST
Payer: MEDICARE

## 2021-08-06 DIAGNOSIS — I89.0 LYMPHEDEMA: ICD-10-CM

## 2021-08-06 PROCEDURE — 97161 PT EVAL LOW COMPLEX 20 MIN: CPT

## 2021-08-06 PROCEDURE — 97535 SELF CARE MNGMENT TRAINING: CPT

## 2021-08-09 ENCOUNTER — TELEPHONE (OUTPATIENT)
Dept: RADIOLOGY | Facility: HOSPITAL | Age: 61
End: 2021-08-09

## 2021-08-10 ENCOUNTER — HOSPITAL ENCOUNTER (OUTPATIENT)
Dept: RADIOLOGY | Facility: HOSPITAL | Age: 61
Discharge: HOME | End: 2021-08-10
Attending: PODIATRIST
Payer: MEDICARE

## 2021-08-10 DIAGNOSIS — M79.604 PAIN AND SWELLING OF RIGHT LOWER EXTREMITY: ICD-10-CM

## 2021-08-10 DIAGNOSIS — M79.89 PAIN AND SWELLING OF LEFT LOWER LEG: ICD-10-CM

## 2021-08-10 DIAGNOSIS — M79.662 PAIN AND SWELLING OF LEFT LOWER LEG: ICD-10-CM

## 2021-08-10 DIAGNOSIS — M79.89 PAIN AND SWELLING OF RIGHT LOWER EXTREMITY: ICD-10-CM

## 2021-08-10 PROCEDURE — 93970 US LOWER EXTREMITY VEINS BILATERAL: ICD-10-PCS | Mod: 26,,, | Performed by: RADIOLOGY

## 2021-08-10 PROCEDURE — 93970 EXTREMITY STUDY: CPT | Mod: TC

## 2021-08-10 PROCEDURE — 93970 EXTREMITY STUDY: CPT | Mod: 26,,, | Performed by: RADIOLOGY

## 2021-08-25 ENCOUNTER — OFFICE VISIT (OUTPATIENT)
Dept: OBSTETRICS AND GYNECOLOGY | Facility: CLINIC | Age: 61
End: 2021-08-25
Payer: MEDICARE

## 2021-08-25 VITALS
SYSTOLIC BLOOD PRESSURE: 120 MMHG | WEIGHT: 204.81 LBS | HEIGHT: 66 IN | DIASTOLIC BLOOD PRESSURE: 84 MMHG | BODY MASS INDEX: 32.92 KG/M2

## 2021-08-25 DIAGNOSIS — R10.2 PELVIC PAIN IN FEMALE: Primary | ICD-10-CM

## 2021-08-25 DIAGNOSIS — D25.1 INTRAMURAL LEIOMYOMA OF UTERUS: ICD-10-CM

## 2021-08-25 PROCEDURE — 3074F SYST BP LT 130 MM HG: CPT | Mod: CPTII,S$GLB,, | Performed by: OBSTETRICS & GYNECOLOGY

## 2021-08-25 PROCEDURE — 99999 PR PBB SHADOW E&M-EST. PATIENT-LVL III: CPT | Mod: PBBFAC,,, | Performed by: OBSTETRICS & GYNECOLOGY

## 2021-08-25 PROCEDURE — 99213 OFFICE O/P EST LOW 20 MIN: CPT | Mod: S$GLB,,, | Performed by: OBSTETRICS & GYNECOLOGY

## 2021-08-25 PROCEDURE — 3079F DIAST BP 80-89 MM HG: CPT | Mod: CPTII,S$GLB,, | Performed by: OBSTETRICS & GYNECOLOGY

## 2021-08-25 PROCEDURE — 99213 PR OFFICE/OUTPT VISIT, EST, LEVL III, 20-29 MIN: ICD-10-PCS | Mod: S$GLB,,, | Performed by: OBSTETRICS & GYNECOLOGY

## 2021-08-25 PROCEDURE — 99999 PR PBB SHADOW E&M-EST. PATIENT-LVL III: ICD-10-PCS | Mod: PBBFAC,,, | Performed by: OBSTETRICS & GYNECOLOGY

## 2021-08-25 PROCEDURE — 1125F AMNT PAIN NOTED PAIN PRSNT: CPT | Mod: CPTII,S$GLB,, | Performed by: OBSTETRICS & GYNECOLOGY

## 2021-08-25 PROCEDURE — 3008F PR BODY MASS INDEX (BMI) DOCUMENTED: ICD-10-PCS | Mod: CPTII,S$GLB,, | Performed by: OBSTETRICS & GYNECOLOGY

## 2021-08-25 PROCEDURE — 1159F MED LIST DOCD IN RCRD: CPT | Mod: CPTII,S$GLB,, | Performed by: OBSTETRICS & GYNECOLOGY

## 2021-08-25 PROCEDURE — 3008F BODY MASS INDEX DOCD: CPT | Mod: CPTII,S$GLB,, | Performed by: OBSTETRICS & GYNECOLOGY

## 2021-08-25 PROCEDURE — 3079F PR MOST RECENT DIASTOLIC BLOOD PRESSURE 80-89 MM HG: ICD-10-PCS | Mod: CPTII,S$GLB,, | Performed by: OBSTETRICS & GYNECOLOGY

## 2021-08-25 PROCEDURE — 1159F PR MEDICATION LIST DOCUMENTED IN MEDICAL RECORD: ICD-10-PCS | Mod: CPTII,S$GLB,, | Performed by: OBSTETRICS & GYNECOLOGY

## 2021-08-25 PROCEDURE — 3074F PR MOST RECENT SYSTOLIC BLOOD PRESSURE < 130 MM HG: ICD-10-PCS | Mod: CPTII,S$GLB,, | Performed by: OBSTETRICS & GYNECOLOGY

## 2021-08-25 PROCEDURE — 1125F PR PAIN SEVERITY QUANTIFIED, PAIN PRESENT: ICD-10-PCS | Mod: CPTII,S$GLB,, | Performed by: OBSTETRICS & GYNECOLOGY

## 2021-09-29 DIAGNOSIS — M25.512 LEFT SHOULDER PAIN, UNSPECIFIED CHRONICITY: Primary | ICD-10-CM

## 2021-10-01 ENCOUNTER — HOSPITAL ENCOUNTER (OUTPATIENT)
Dept: RADIOLOGY | Facility: HOSPITAL | Age: 61
Discharge: HOME | End: 2021-10-01
Attending: STUDENT IN AN ORGANIZED HEALTH CARE EDUCATION/TRAINING PROGRAM
Payer: MEDICARE

## 2021-10-01 ENCOUNTER — OFFICE VISIT (OUTPATIENT)
Dept: ORTHOPEDICS | Facility: CLINIC | Age: 61
End: 2021-10-01
Payer: MEDICARE

## 2021-10-01 VITALS — HEIGHT: 66 IN | WEIGHT: 204 LBS | BODY MASS INDEX: 32.78 KG/M2

## 2021-10-01 DIAGNOSIS — M75.102 NONTRAUMATIC TEAR OF LEFT ROTATOR CUFF, UNSPECIFIED TEAR EXTENT: ICD-10-CM

## 2021-10-01 DIAGNOSIS — M25.512 LEFT SHOULDER PAIN, UNSPECIFIED CHRONICITY: Primary | ICD-10-CM

## 2021-10-01 DIAGNOSIS — M25.512 LEFT SHOULDER PAIN, UNSPECIFIED CHRONICITY: ICD-10-CM

## 2021-10-01 PROCEDURE — 3008F PR BODY MASS INDEX (BMI) DOCUMENTED: ICD-10-PCS | Mod: CPTII,S$GLB,, | Performed by: STUDENT IN AN ORGANIZED HEALTH CARE EDUCATION/TRAINING PROGRAM

## 2021-10-01 PROCEDURE — 3008F BODY MASS INDEX DOCD: CPT | Mod: CPTII,S$GLB,, | Performed by: STUDENT IN AN ORGANIZED HEALTH CARE EDUCATION/TRAINING PROGRAM

## 2021-10-01 PROCEDURE — 1159F PR MEDICATION LIST DOCUMENTED IN MEDICAL RECORD: ICD-10-PCS | Mod: CPTII,S$GLB,, | Performed by: STUDENT IN AN ORGANIZED HEALTH CARE EDUCATION/TRAINING PROGRAM

## 2021-10-01 PROCEDURE — 1159F MED LIST DOCD IN RCRD: CPT | Mod: CPTII,S$GLB,, | Performed by: STUDENT IN AN ORGANIZED HEALTH CARE EDUCATION/TRAINING PROGRAM

## 2021-10-01 PROCEDURE — 73030 XR SHOULDER COMPLETE 2 OR MORE VIEWS LEFT: ICD-10-PCS | Mod: 26,LT,, | Performed by: RADIOLOGY

## 2021-10-01 PROCEDURE — 1160F PR REVIEW ALL MEDS BY PRESCRIBER/CLIN PHARMACIST DOCUMENTED: ICD-10-PCS | Mod: CPTII,S$GLB,, | Performed by: STUDENT IN AN ORGANIZED HEALTH CARE EDUCATION/TRAINING PROGRAM

## 2021-10-01 PROCEDURE — 73030 X-RAY EXAM OF SHOULDER: CPT | Mod: 26,LT,, | Performed by: RADIOLOGY

## 2021-10-01 PROCEDURE — 99203 PR OFFICE/OUTPT VISIT, NEW, LEVL III, 30-44 MIN: ICD-10-PCS | Mod: S$GLB,,, | Performed by: STUDENT IN AN ORGANIZED HEALTH CARE EDUCATION/TRAINING PROGRAM

## 2021-10-01 PROCEDURE — 73030 X-RAY EXAM OF SHOULDER: CPT | Mod: TC,LT

## 2021-10-01 PROCEDURE — 4010F PR ACE/ARB THEARPY RXD/TAKEN: ICD-10-PCS | Mod: CPTII,S$GLB,, | Performed by: STUDENT IN AN ORGANIZED HEALTH CARE EDUCATION/TRAINING PROGRAM

## 2021-10-01 PROCEDURE — 99203 OFFICE O/P NEW LOW 30 MIN: CPT | Mod: S$GLB,,, | Performed by: STUDENT IN AN ORGANIZED HEALTH CARE EDUCATION/TRAINING PROGRAM

## 2021-10-01 PROCEDURE — 99999 PR PBB SHADOW E&M-EST. PATIENT-LVL V: CPT | Mod: PBBFAC,,, | Performed by: STUDENT IN AN ORGANIZED HEALTH CARE EDUCATION/TRAINING PROGRAM

## 2021-10-01 PROCEDURE — 4010F ACE/ARB THERAPY RXD/TAKEN: CPT | Mod: CPTII,S$GLB,, | Performed by: STUDENT IN AN ORGANIZED HEALTH CARE EDUCATION/TRAINING PROGRAM

## 2021-10-01 PROCEDURE — 99999 PR PBB SHADOW E&M-EST. PATIENT-LVL V: ICD-10-PCS | Mod: PBBFAC,,, | Performed by: STUDENT IN AN ORGANIZED HEALTH CARE EDUCATION/TRAINING PROGRAM

## 2021-10-01 PROCEDURE — 1160F RVW MEDS BY RX/DR IN RCRD: CPT | Mod: CPTII,S$GLB,, | Performed by: STUDENT IN AN ORGANIZED HEALTH CARE EDUCATION/TRAINING PROGRAM

## 2021-10-01 RX ORDER — DICLOFENAC SODIUM 10 MG/G
GEL TOPICAL
COMMUNITY
Start: 2020-10-21

## 2021-10-01 RX ORDER — ROPINIROLE 0.5 MG/1
TABLET, FILM COATED ORAL
COMMUNITY

## 2021-10-01 RX ORDER — PANTOPRAZOLE SODIUM 20 MG/1
TABLET, DELAYED RELEASE ORAL
COMMUNITY
End: 2022-07-25

## 2021-10-01 RX ORDER — METOPROLOL TARTRATE 100 MG/1
TABLET ORAL
COMMUNITY

## 2021-10-01 RX ORDER — LOVASTATIN 10 MG/1
TABLET ORAL
COMMUNITY

## 2021-10-01 RX ORDER — LOSARTAN POTASSIUM 100 MG/1
TABLET ORAL
COMMUNITY

## 2021-10-01 RX ORDER — ASPIRIN 81 MG/1
TABLET ORAL
COMMUNITY

## 2021-10-01 RX ORDER — CYCLOBENZAPRINE HCL 5 MG
TABLET ORAL
COMMUNITY

## 2021-10-01 RX ORDER — HYDROCHLOROTHIAZIDE 12.5 MG/1
CAPSULE ORAL
COMMUNITY

## 2021-10-01 RX ORDER — GABAPENTIN 100 MG/1
CAPSULE ORAL
COMMUNITY

## 2021-10-01 RX ORDER — OXYCODONE HYDROCHLORIDE 100 MG/5ML
SOLUTION ORAL
COMMUNITY

## 2021-10-01 RX ORDER — PSYLLIUM HUSK 0.4 G
CAPSULE ORAL
COMMUNITY

## 2021-10-01 RX ORDER — NITROGLYCERIN 0.4 MG/1
TABLET SUBLINGUAL
COMMUNITY

## 2021-10-01 RX ORDER — PYRIDOXINE HCL (VITAMIN B6) 100 MG
TABLET ORAL
COMMUNITY

## 2021-10-01 RX ORDER — DIAZEPAM 5 MG/5ML
SOLUTION ORAL
COMMUNITY

## 2021-10-04 RX ORDER — DIAZEPAM 5 MG/1
5 TABLET ORAL EVERY 6 HOURS PRN
Qty: 2 TABLET | Refills: 0 | Status: SHIPPED | OUTPATIENT
Start: 2021-10-04 | End: 2021-11-03

## 2021-10-08 ENCOUNTER — TELEPHONE (OUTPATIENT)
Dept: ORTHOPEDICS | Facility: CLINIC | Age: 61
End: 2021-10-08

## 2021-10-13 ENCOUNTER — HOSPITAL ENCOUNTER (OUTPATIENT)
Dept: RADIOLOGY | Facility: HOSPITAL | Age: 61
Discharge: HOME | End: 2021-10-13
Attending: STUDENT IN AN ORGANIZED HEALTH CARE EDUCATION/TRAINING PROGRAM
Payer: MEDICARE

## 2021-10-13 DIAGNOSIS — M25.512 LEFT SHOULDER PAIN, UNSPECIFIED CHRONICITY: ICD-10-CM

## 2021-10-13 PROCEDURE — 73221 MRI JOINT UPR EXTREM W/O DYE: CPT | Mod: TC,LT

## 2021-10-19 ENCOUNTER — OFFICE VISIT (OUTPATIENT)
Dept: ORTHOPEDICS | Facility: CLINIC | Age: 61
End: 2021-10-19
Payer: MEDICARE

## 2021-10-19 VITALS — HEIGHT: 66 IN | WEIGHT: 204 LBS | BODY MASS INDEX: 32.78 KG/M2

## 2021-10-19 DIAGNOSIS — M19.012 GLENOHUMERAL ARTHRITIS, LEFT: Primary | ICD-10-CM

## 2021-10-19 PROCEDURE — 4010F ACE/ARB THERAPY RXD/TAKEN: CPT | Mod: CPTII,S$GLB,, | Performed by: STUDENT IN AN ORGANIZED HEALTH CARE EDUCATION/TRAINING PROGRAM

## 2021-10-19 PROCEDURE — 1160F RVW MEDS BY RX/DR IN RCRD: CPT | Mod: CPTII,S$GLB,, | Performed by: STUDENT IN AN ORGANIZED HEALTH CARE EDUCATION/TRAINING PROGRAM

## 2021-10-19 PROCEDURE — 3008F PR BODY MASS INDEX (BMI) DOCUMENTED: ICD-10-PCS | Mod: CPTII,S$GLB,, | Performed by: STUDENT IN AN ORGANIZED HEALTH CARE EDUCATION/TRAINING PROGRAM

## 2021-10-19 PROCEDURE — 99213 OFFICE O/P EST LOW 20 MIN: CPT | Mod: S$GLB,,, | Performed by: STUDENT IN AN ORGANIZED HEALTH CARE EDUCATION/TRAINING PROGRAM

## 2021-10-19 PROCEDURE — 1159F MED LIST DOCD IN RCRD: CPT | Mod: CPTII,S$GLB,, | Performed by: STUDENT IN AN ORGANIZED HEALTH CARE EDUCATION/TRAINING PROGRAM

## 2021-10-19 PROCEDURE — 1160F PR REVIEW ALL MEDS BY PRESCRIBER/CLIN PHARMACIST DOCUMENTED: ICD-10-PCS | Mod: CPTII,S$GLB,, | Performed by: STUDENT IN AN ORGANIZED HEALTH CARE EDUCATION/TRAINING PROGRAM

## 2021-10-19 PROCEDURE — 99999 PR PBB SHADOW E&M-EST. PATIENT-LVL II: CPT | Mod: PBBFAC,,, | Performed by: STUDENT IN AN ORGANIZED HEALTH CARE EDUCATION/TRAINING PROGRAM

## 2021-10-19 PROCEDURE — 3008F BODY MASS INDEX DOCD: CPT | Mod: CPTII,S$GLB,, | Performed by: STUDENT IN AN ORGANIZED HEALTH CARE EDUCATION/TRAINING PROGRAM

## 2021-10-19 PROCEDURE — 99213 PR OFFICE/OUTPT VISIT, EST, LEVL III, 20-29 MIN: ICD-10-PCS | Mod: S$GLB,,, | Performed by: STUDENT IN AN ORGANIZED HEALTH CARE EDUCATION/TRAINING PROGRAM

## 2021-10-19 PROCEDURE — 4010F PR ACE/ARB THEARPY RXD/TAKEN: ICD-10-PCS | Mod: CPTII,S$GLB,, | Performed by: STUDENT IN AN ORGANIZED HEALTH CARE EDUCATION/TRAINING PROGRAM

## 2021-10-19 PROCEDURE — 1159F PR MEDICATION LIST DOCUMENTED IN MEDICAL RECORD: ICD-10-PCS | Mod: CPTII,S$GLB,, | Performed by: STUDENT IN AN ORGANIZED HEALTH CARE EDUCATION/TRAINING PROGRAM

## 2021-10-19 PROCEDURE — 99999 PR PBB SHADOW E&M-EST. PATIENT-LVL II: ICD-10-PCS | Mod: PBBFAC,,, | Performed by: STUDENT IN AN ORGANIZED HEALTH CARE EDUCATION/TRAINING PROGRAM

## 2021-10-20 NOTE — PATIENT INSTRUCTIONS
Atrophic Vaginitis    Atrophic vaginitis means the walls of your vagina have become thin. This happens when your body makes too little of the hormone estrogen. Menopause or surgical removal of the ovaries are the most common causes for a drop in estrogen. Breastfeeding can also cause the hormone level to drop.  Symptoms of atrophic vaginitis include:  · Dryness, soreness, burning, or itching in the vagina  · Vaginal discharge  Sex can be uncomfortable, even painful. After sex, you may have bleeding from your vagina. You may also have burning or pain when you urinate.  Home care  Your healthcare provider may recommend 1 or more of these as treatment:  · Vaginal creams, lotions, and lubricants. These products help relieve vaginal dryness. They dont need a prescription. They can be found in the personal care section of most pharmacies. Creams and lotions are used daily to help keep the vagina moist. Lubricants help reduce dryness and pain during sex. Choose water-based lubricants. Dont use petroleum jelly, mineral oil, or other oils. These increase the chance of infection.   · Hormone therapy (HT). HT increases the amount of estrogen in your body. This can help manage or relieve symptoms. HT can be given in pill form. It may be given as a lotion, cream, ring put into the vagina, or a patch on the skin. The risks and benefits of HT vary for each woman. For instance, your risk may be higher if you have had breast cancer. Discuss this treatment with your healthcare provider. Not every woman can use HT.  You dont need to give up (abstain from) sex. In fact, regular sex can help keep vaginal tissues healthy. Take steps to make sex more comfortable by using water-based lubricants.  Preventing infections  Atrophic vaginitis makes an infection of the vagina or the urinary tract more likely. To help reduce your risk:  · Keep your genitals clean. When you bathe, wash the outside of your vagina with mild soap and water.  Clean gently between the folds of your vagina.  · Wipe from front to back after a bowel movement.  · Dont douche unless your healthcare provider tells you to.  · Avoid scented toilet paper, scented vaginal sprays, and scented tampons.  · Avoid wearing clothes that are tight in the crotch. These include pantyhose, jeans, and leggings. Wear cotton underwear. Change it every day.  Follow-up care  Follow up with your healthcare provider, or as advised.  When to seek medical advice  Call your health care provider right away if any of these occur:  · Fever of 100.4°F (38°C) or higher, or as directed by your healthcare provider  · Symptoms dont go away or get worse even with treatment  · Vaginal area swells or becomes painful  · Vaginal area bleeds, but not because of your period  · Bad-smelling discharge from the vagina  · Pain or burning when you urinate or you have trouble passing urine  · Open sores develop around vagina   Date Last Reviewed: 12/1/2016  © 6021-0576 [employer]. [address] [place] [address], [place] [address]. All rights reserved. This information is not intended as a substitute for professional medical care. Always follow your healthcare professional's instructions.         yes

## 2021-12-10 ENCOUNTER — IMMUNIZATION (OUTPATIENT)
Dept: PHARMACY | Facility: CLINIC | Age: 61
End: 2021-12-10
Payer: MEDICAID

## 2021-12-10 DIAGNOSIS — Z23 NEED FOR VACCINATION: Primary | ICD-10-CM

## 2022-04-08 ENCOUNTER — OFFICE VISIT (OUTPATIENT)
Dept: PODIATRY | Facility: CLINIC | Age: 62
End: 2022-04-08
Payer: MEDICARE

## 2022-04-08 VITALS — BODY MASS INDEX: 32.78 KG/M2 | WEIGHT: 204 LBS | HEIGHT: 66 IN

## 2022-04-08 DIAGNOSIS — E11.42 DM TYPE 2 WITH DIABETIC PERIPHERAL NEUROPATHY: ICD-10-CM

## 2022-04-08 DIAGNOSIS — M79.7 FIBROMYALGIA: ICD-10-CM

## 2022-04-08 DIAGNOSIS — M54.16 LUMBAR RADICULOPATHY, CHRONIC: Primary | ICD-10-CM

## 2022-04-08 DIAGNOSIS — M79.672 BILATERAL FOOT PAIN: ICD-10-CM

## 2022-04-08 DIAGNOSIS — M79.671 BILATERAL FOOT PAIN: ICD-10-CM

## 2022-04-08 DIAGNOSIS — I89.0 LYMPHEDEMA: ICD-10-CM

## 2022-04-08 PROCEDURE — 3008F BODY MASS INDEX DOCD: CPT | Mod: CPTII,S$GLB,, | Performed by: PODIATRIST

## 2022-04-08 PROCEDURE — 99214 OFFICE O/P EST MOD 30 MIN: CPT | Mod: S$GLB,,, | Performed by: PODIATRIST

## 2022-04-08 PROCEDURE — 1159F MED LIST DOCD IN RCRD: CPT | Mod: CPTII,S$GLB,, | Performed by: PODIATRIST

## 2022-04-08 PROCEDURE — 1159F PR MEDICATION LIST DOCUMENTED IN MEDICAL RECORD: ICD-10-PCS | Mod: CPTII,S$GLB,, | Performed by: PODIATRIST

## 2022-04-08 PROCEDURE — 99999 PR PBB SHADOW E&M-EST. PATIENT-LVL IV: ICD-10-PCS | Mod: PBBFAC,,, | Performed by: PODIATRIST

## 2022-04-08 PROCEDURE — 99999 PR PBB SHADOW E&M-EST. PATIENT-LVL IV: CPT | Mod: PBBFAC,,, | Performed by: PODIATRIST

## 2022-04-08 PROCEDURE — 99214 PR OFFICE/OUTPT VISIT, EST, LEVL IV, 30-39 MIN: ICD-10-PCS | Mod: S$GLB,,, | Performed by: PODIATRIST

## 2022-04-08 PROCEDURE — 3008F PR BODY MASS INDEX (BMI) DOCUMENTED: ICD-10-PCS | Mod: CPTII,S$GLB,, | Performed by: PODIATRIST

## 2022-04-08 NOTE — PATIENT INSTRUCTIONS
Diabetic Shoe Locations      [facility]   Address: [address].  [place] [address]  Phone: [phone]    [facility]  Address: [address] [place] [address]  Phone: [phone]    [facility]  Address: [address], [place] [address]  Phone: [phone]    [address]. [place]  Phone: [phone]

## 2022-04-08 NOTE — PROGRESS NOTES
PODIATRIC MEDICINE AND SURGERY  4/8/2022    PCP: Dr. Peoples, 4/2022    CHIEF COMPLAINT   Chief Complaint   Patient presents with    Diabetic Foot Exam     Diabetic routine exam, pain all over both feet, x years, b/l LE swelling, rates pain 9/10, wants diabetic shoes, diabetic, wears tennis and socks, last seen PCP Dr. Omer Radford on x 1 month       HPI:    Ju Sinclair is a 62 y.o. female who has a past medical history of Anxiety disorder, Breast disorder, Hyperlipidemia (5/14/2018), Hypertension, Mental disorder, Neuropathy, and Sleep apnea.   Ju presents to clinic today complaining of bilateral leg and foot pain associated with sweling. She states both feet and legs are extremely painful. She describes pain as sharp, shooting, burning, achy, and throbbing pain. She does have hx of radiculopathy and neck pain. She does have pain management physician- Dr. Kauffman. She is currently taking pain medication and low dose gabapentin. She requests DM shoes.      Patient denies other pedal complaints at this time.      No results found for: HGBA1C    PMH  Past Medical History:   Diagnosis Date    Anxiety disorder     Breast disorder     Hyperlipidemia 5/14/2018    Hypertension     Mental disorder     Neuropathy     Sleep apnea        PROBLEM LIST  Patient Active Problem List    Diagnosis Date Noted    Lumbar radiculopathy, chronic 04/08/2022    Lymphedema 08/06/2021    Depression 05/14/2018    Anxiety 05/14/2018    Essential hypertension 05/14/2018    Hyperlipidemia 05/14/2018    Primary osteoarthritis involving multiple joints 03/02/2016    Hypovitaminosis D 03/01/2016    Fibromyalgia 02/09/2016       MEDS  Current Outpatient Medications on File Prior to Visit   Medication Sig Dispense Refill    amLODIPine (NORVASC) 10 MG tablet Take 1 tablet (10 mg total) by mouth once daily. 30 tablet 0    aspirin (ECOTRIN) 81 MG EC tablet 1 tablet      calcium carbonate-vitamin D3 1,000 mg(2,500 mg)-800  unit Tab 1 tablet      cyclobenzaprine (FLEXERIL) 5 MG tablet 1 tablet at bedtime as needed      diazePAM (VALIUM) 1 mg/mL liquid (PEDS) by NOT APPLICABLE route.      diclofenac sodium (VOLTAREN) 1 % Gel as directed      doxepin (ZONALON) 5 % cream       estradioL (ESTRACE) 0.01 % (0.1 mg/gram) vaginal cream Place 0.5 g vaginally every Mon, Wed, Fri. 42.5 g 6    gabapentin (NEURONTIN) 100 MG capsule 1 cap(s) By Mouth At Bedtime      hydroCHLOROthiazide (HYDRODIURIL) 25 MG tablet Take 25 mg by mouth once daily.  3    hydroCHLOROthiazide (MICROZIDE) 12.5 mg capsule hydrochlorothiazide Take No date recorded No form recorded No frequency recorded No route recorded No set duration recorded No set duration amount recorded active No dosage strength recorded No dosage strength units of measure recorded      HYDROcodone-acetaminophen (NORCO) 7.5-325 mg per tablet TAKE 1 TABLET BY MOUTH EVERY 12 HOURS AS NEEDED FOR 30 DAYS (PA WAITING)  0    hydrOXYzine HCl (ATARAX) 10 MG Tab TAKE 1 TABLET BY MOUTH ONCE A DAY AS NEEDED TAKE ONLY FOR ITCH SYMPTOMS/ DO NOT DRIVE ON MED  5    losartan (COZAAR) 100 MG tablet losartan Take No date recorded No form recorded No frequency recorded No route recorded No set duration recorded No set duration amount recorded active No dosage strength recorded No dosage strength units of measure recorded      losartan (COZAAR) 50 MG tablet Take 50 mg by mouth once daily.      lovastatin (MEVACOR) 10 MG tablet lovastatin Take No date recorded No form recorded No frequency recorded No route recorded No set duration recorded No set duration amount recorded suspended No dosage strength recorded No dosage strength units of measure recorded      metoprolol succinate (TOPROL-XL) 50 MG 24 hr tablet Take 1 tablet (50 mg total) by mouth once daily. 30 tablet 0    metoprolol tartrate (LOPRESSOR) 100 MG tablet metoprolol tartrate Take No date recorded No form recorded No frequency recorded No route  recorded No set duration recorded No set duration amount recorded active No dosage strength recorded No dosage strength units of measure recorded      mirtazapine (REMERON) 15 MG tablet TAKE 1 TABLET AT BEDTIME ONCE A DAY ORALLY  3    multivit with min-folic acid (ADULT MULTIVITAMIN EXTRA VITD3) 200 mcg Chew as directed      nitroGLYCERIN (NITROSTAT) 0.4 MG SL tablet as directed      oxycodone HCl (OXYCODONE 10 MG/0.5 ML ORAL CONC) 10 mg/0.5 mL Syrg oxycodone Take No date recorded No form recorded No frequency recorded No route recorded No set duration recorded No set duration amount recorded suspended No dosage strength recorded No dosage strength units of measure recorded      pantoprazole (PROTONIX) 20 MG tablet Take 20 mg by mouth once daily.  0    pantoprazole (PROTONIX) 20 MG tablet 1 tab(s) By Mouth Daily      pyridoxine, vitamin B6, (B-6) 100 MG Tab 1 tablet      rOPINIRole (REQUIP) 0.5 MG tablet 1 tab(s) By Mouth As Needed      venlafaxine (EFFEXOR-XR) 150 MG Cp24 TAKE 1 CAPSULE BY MOUTH EVERY DAY WITH FOOD  3     No current facility-administered medications on file prior to visit.       Medication List with Changes/Refills   Current Medications    AMLODIPINE (NORVASC) 10 MG TABLET    Take 1 tablet (10 mg total) by mouth once daily.    ASPIRIN (ECOTRIN) 81 MG EC TABLET    1 tablet    CALCIUM CARBONATE-VITAMIN D3 1,000 MG(2,500 MG)-800 UNIT TAB    1 tablet    CYCLOBENZAPRINE (FLEXERIL) 5 MG TABLET    1 tablet at bedtime as needed    DIAZEPAM (VALIUM) 1 MG/ML LIQUID (PEDS)    by NOT APPLICABLE route.    DICLOFENAC SODIUM (VOLTAREN) 1 % GEL    as directed    DOXEPIN (ZONALON) 5 % CREAM        ESTRADIOL (ESTRACE) 0.01 % (0.1 MG/GRAM) VAGINAL CREAM    Place 0.5 g vaginally every Mon, Wed, Fri.    GABAPENTIN (NEURONTIN) 100 MG CAPSULE    1 cap(s) By Mouth At Bedtime    HYDROCHLOROTHIAZIDE (HYDRODIURIL) 25 MG TABLET    Take 25 mg by mouth once daily.    HYDROCHLOROTHIAZIDE (MICROZIDE) 12.5 MG CAPSULE     hydrochlorothiazide Take No date recorded No form recorded No frequency recorded No route recorded No set duration recorded No set duration amount recorded active No dosage strength recorded No dosage strength units of measure recorded    HYDROCODONE-ACETAMINOPHEN (NORCO) 7.5-325 MG PER TABLET    TAKE 1 TABLET BY MOUTH EVERY 12 HOURS AS NEEDED FOR 30 DAYS (PA WAITING)    HYDROXYZINE HCL (ATARAX) 10 MG TAB    TAKE 1 TABLET BY MOUTH ONCE A DAY AS NEEDED TAKE ONLY FOR ITCH SYMPTOMS/ DO NOT DRIVE ON MED    LOSARTAN (COZAAR) 100 MG TABLET    losartan Take No date recorded No form recorded No frequency recorded No route recorded No set duration recorded No set duration amount recorded active No dosage strength recorded No dosage strength units of measure recorded    LOSARTAN (COZAAR) 50 MG TABLET    Take 50 mg by mouth once daily.    LOVASTATIN (MEVACOR) 10 MG TABLET    lovastatin Take No date recorded No form recorded No frequency recorded No route recorded No set duration recorded No set duration amount recorded suspended No dosage strength recorded No dosage strength units of measure recorded    METOPROLOL SUCCINATE (TOPROL-XL) 50 MG 24 HR TABLET    Take 1 tablet (50 mg total) by mouth once daily.    METOPROLOL TARTRATE (LOPRESSOR) 100 MG TABLET    metoprolol tartrate Take No date recorded No form recorded No frequency recorded No route recorded No set duration recorded No set duration amount recorded active No dosage strength recorded No dosage strength units of measure recorded    MIRTAZAPINE (REMERON) 15 MG TABLET    TAKE 1 TABLET AT BEDTIME ONCE A DAY ORALLY    MULTIVIT WITH MIN-FOLIC ACID (ADULT MULTIVITAMIN EXTRA VITD3) 200 MCG CHEW    as directed    NITROGLYCERIN (NITROSTAT) 0.4 MG SL TABLET    as directed    OXYCODONE HCL (OXYCODONE 10 MG/0.5 ML ORAL CONC) 10 MG/0.5 ML SYRG    oxycodone Take No date recorded No form recorded No frequency recorded No route recorded No set duration recorded No set duration amount  recorded suspended No dosage strength recorded No dosage strength units of measure recorded    PANTOPRAZOLE (PROTONIX) 20 MG TABLET    Take 20 mg by mouth once daily.    PANTOPRAZOLE (PROTONIX) 20 MG TABLET    1 tab(s) By Mouth Daily    PYRIDOXINE, VITAMIN B6, (B-6) 100 MG TAB    1 tablet    ROPINIROLE (REQUIP) 0.5 MG TABLET    1 tab(s) By Mouth As Needed    VENLAFAXINE (EFFEXOR-XR) 150 MG CP24    TAKE 1 CAPSULE BY MOUTH EVERY DAY WITH FOOD       PSH     Past Surgical History:   Procedure Laterality Date     SECTION      SHOULDER SURGERY      TONSILLECTOMY          ALL  Review of patient's allergies indicates:   Allergen Reactions    Amlodipine besylate Anaphylaxis    Atorvastatin calcium      Other reaction(s): muscle pain    Penicillin      Other reaction(s): break out    Tramadol hcl      Other reaction(s): sick feeling    Meloxicam Palpitations and Rash    Pcn [penicillins] Hives    Tramadol Palpitations       SOC     Social History     Tobacco Use    Smoking status: Never Smoker    Smokeless tobacco: Never Used   Substance Use Topics    Alcohol use: No     Alcohol/week: 0.0 standard drinks    Drug use: No         FAMILY HX    Family History   Problem Relation Age of Onset    Eclampsia Paternal Grandmother     Diabetes Paternal Grandmother     Breast cancer Sister     Colon cancer Maternal Grandfather     Breast cancer Maternal Aunt     Ovarian cancer Neg Hx     Cancer Neg Hx     Hypertension Neg Hx     Miscarriages / Stillbirths Neg Hx      labor Neg Hx     Stroke Neg Hx             REVIEW OF SYSTEMS  General: This patient is well-developed, well-nourished and appears stated age, well-oriented to person, place and time, and cooperative and pleasant on today's visit  Constitutional: Negative for chills and fever.   Respiratory: Negative for shortness of breath.    Cardiovascular: Negative for chest pain, palpitations, orthopnea  Gastrointestinal: Negative for diarrhea,  "nausea and vomiting.   Musculoskeletal: Positive for above noted in HPI  Skin: Positive for skin and/or nail changes   Neurological: Negative for tingling and sensory changes  Peripheral Vascular: no claudication or cyanosis  Psychiatric/Behavioral: Negative for altered mental status     PHYSICAL EXAM:      Vitals:    04/08/22 1051   Weight: 92.5 kg (204 lb)   Height: 5' 6" (1.676 m)   PainSc:   9         LOWER EXTREMITY PHYSICAL EXAM  VASCULAR  Dorsalis pedis and posterior tibial pulses palpable 2/4 bilaterally. Capillary refill time immediate to the toes. Feet are warm to the touch. Skin temperature warm to warm from proximally to distally There are no varicosities, telangiectasias noted to bilateral foot and ankle regions. There are no ecchymoses noted to bilateral foot and ankle regions. There is non pitting gross lower extremity edema.    DERMATOLOGIC  Skin moist with healthy texture and turgor.There are no open ulcerations, lacerations, or fissures to bilateral foot and ankle regions. There are no signs of infection as there is no erythema, no proximal-extending lymphangiitis, no fluctuance, or crepitus noted on palpation to bilateral foot and ankle regions. There is no interdigital maceration.   There are no hyperkeratotic lesions noted to feet. Nails are well-trimmed.    NEUROLOGIC  Epicritic sensation is intact as the patient is able to sense light touch to bilateral foot and ankle regions. Achilles and patellar deep tendon reflexes intact. Babinski reflex absent    ORTHOPEDIC/BIOMECHANICAL  Muscle strength AT/EHL/EDL/PT: 5/5; Achilles/Gastroc/Soleus: 5/5; PB/PL: 5/5 Muscle tone is normal. Ankle joint ROM non painful with DF/PF, non-crepitus; STJ ROM  Inv/ev non painful, non crepitus ; MTPJ b/l  DF/PF, non crepitus       ASSESSMENT     Encounter Diagnoses   Name Primary?    DM type 2 with diabetic peripheral neuropathy     Bilateral foot pain     Lymphedema     Lumbar radiculopathy, chronic Yes    " Fibromyalgia          PLAN  Patient was educated about clinical and imaging findings, and verbalizes understanding of above.     Diagnoses and all orders for this visit:  Lumbar radiculopathy, chronic    DM type 2 with diabetic peripheral neuropathy  -     DIABETIC SHOES FOR HOME USE    Bilateral foot pain    Lymphedema    Fibromyalgia      Continue with pain management per Dr. Kauffman    I counseled the patient on his/her Diabetic Mellitus regarding today's clinical examination and objection findings. We did also discuss recent medication changes, pertinent labs and imaging evaluations and other medical consultation notes and progress notes. Greater than 50% of this visit was spent on counseling and coordination of care. Greater than 20 minutes of this appt. was spent on education about the diabetic foot, in relation to PVD and/or neuropathy, and the prevention of limb loss. Patient received these instructions in written format and discussed verbally.     Comprehensive in depth discussion provided in written format in regards to diabetic/at risk foot health and amputation prevention. Summary as noted below:  1. Anti-diabetic medications should be taken regularly to prevent complications.  2. Feet should be washed daily.  3. Lukewarm water should be used to wash feet.   4. The temperature of the water should be checked before washing feet.   5. Feet should be dried completely after washing and in between toes.  6. Talcum powder (Zeasorb ( should be used to keep the areas betweent he toes dry.   7. Lotion or moisturizing cream should be applied to the fet daily to prevent dryness of the skin. Examples provided to patient OTC.  8. Lotion should not be applied between the toes.  9. Socks should be changed daily. I recommend white socks in order to be able to note any drainage or bleeding if injury occurs.  10. Toenails should be trimmed straight across (if applicable).  11. Feet should be inspected at least once a  day.  12. Diabetic patients should wear comfortable shoes. Examples provided.   13. The inside of the shoes should be inspected before wearing them.  14. Diabetic patients should not walk barefoot.   15. Diabetic patients should consult their podiatrist if their feet have redness, blisters, cuts, or wounds.       Shoe gear is inspected and wear and proper fit/type. If applicable/covered, pt was provided prescription for diabetic shoes and/or custom molded inserts.  Shoe Fitting recommendations:  1. Have foot measured while standing.  2. Try on both shoes and walk around to be sure shoes are comfortable.  3. Allow at least a thumbs width of space at end of longest toe in shoes. Make sure you can wiggle toes inside shoe.  4. Try on shoes at end of the day due to foot swelling.   5. Look for shoes with a round and wide flexible  toe box, extra cushion, and thick/stiff heel. Check inside shoes and avoid thick seams.   6. Breathable anti-microbial or moisture wicking fabric is preferred. Leather uppers are optimal.     Patient is reminded of the importance of good nutrition and blood sugar control to help prevent podiatric complications of diabetes. I discussed proper blood glucose control and co-management with diabetes education team and patient's PCP and/or Endocrinology Advanced Practice Provider.  Patient  will continue to monitor the areas daily, inspect feet, wear protective shoe gear when ambulatory, moisturizer to maintain skin integrity.    Follow up as scheduled below or sooner if any problem arises with foot and/or ankle.     The total visit time face to face with the patient was over 30 minutes. Time spent in counseling, discussion and coordination of care with the patient was over 15 minutes. Topics discussed as noted above.    Disclaimer:  This note may have been prepared using voice recognition software, it may have not been extensively proofed, as such there could be errors within the text such as sound  alike errors.         Future Appointments   Date Time Provider Department Center   7/13/2022 10:40 AM [facility] [id number] [place]       Report Electronically Signed By:     Roselyn Sam DPM   Podiatry  [facility]  4/8/2022

## 2022-07-25 ENCOUNTER — OFFICE VISIT (OUTPATIENT)
Dept: OBSTETRICS AND GYNECOLOGY | Facility: CLINIC | Age: 62
End: 2022-07-25
Payer: MEDICARE

## 2022-07-25 VITALS
WEIGHT: 227.75 LBS | SYSTOLIC BLOOD PRESSURE: 112 MMHG | DIASTOLIC BLOOD PRESSURE: 66 MMHG | BODY MASS INDEX: 36.76 KG/M2

## 2022-07-25 DIAGNOSIS — Z01.419 WELL WOMAN EXAM WITH ROUTINE GYNECOLOGICAL EXAM: Primary | ICD-10-CM

## 2022-07-25 DIAGNOSIS — N95.2 VAGINAL ATROPHY: ICD-10-CM

## 2022-07-25 PROCEDURE — 3008F BODY MASS INDEX DOCD: CPT | Mod: CPTII,S$GLB,, | Performed by: NURSE PRACTITIONER

## 2022-07-25 PROCEDURE — 3078F PR MOST RECENT DIASTOLIC BLOOD PRESSURE < 80 MM HG: ICD-10-PCS | Mod: CPTII,S$GLB,, | Performed by: NURSE PRACTITIONER

## 2022-07-25 PROCEDURE — 1160F PR REVIEW ALL MEDS BY PRESCRIBER/CLIN PHARMACIST DOCUMENTED: ICD-10-PCS | Mod: CPTII,S$GLB,, | Performed by: NURSE PRACTITIONER

## 2022-07-25 PROCEDURE — G0101 CA SCREEN;PELVIC/BREAST EXAM: HCPCS | Mod: S$GLB,,, | Performed by: NURSE PRACTITIONER

## 2022-07-25 PROCEDURE — 1159F MED LIST DOCD IN RCRD: CPT | Mod: CPTII,S$GLB,, | Performed by: NURSE PRACTITIONER

## 2022-07-25 PROCEDURE — 3008F PR BODY MASS INDEX (BMI) DOCUMENTED: ICD-10-PCS | Mod: CPTII,S$GLB,, | Performed by: NURSE PRACTITIONER

## 2022-07-25 PROCEDURE — 4010F PR ACE/ARB THEARPY RXD/TAKEN: ICD-10-PCS | Mod: CPTII,S$GLB,, | Performed by: NURSE PRACTITIONER

## 2022-07-25 PROCEDURE — 1159F PR MEDICATION LIST DOCUMENTED IN MEDICAL RECORD: ICD-10-PCS | Mod: CPTII,S$GLB,, | Performed by: NURSE PRACTITIONER

## 2022-07-25 PROCEDURE — 1160F RVW MEDS BY RX/DR IN RCRD: CPT | Mod: CPTII,S$GLB,, | Performed by: NURSE PRACTITIONER

## 2022-07-25 PROCEDURE — 3074F PR MOST RECENT SYSTOLIC BLOOD PRESSURE < 130 MM HG: ICD-10-PCS | Mod: CPTII,S$GLB,, | Performed by: NURSE PRACTITIONER

## 2022-07-25 PROCEDURE — 99999 PR PBB SHADOW E&M-EST. PATIENT-LVL IV: ICD-10-PCS | Mod: PBBFAC,,, | Performed by: NURSE PRACTITIONER

## 2022-07-25 PROCEDURE — 3078F DIAST BP <80 MM HG: CPT | Mod: CPTII,S$GLB,, | Performed by: NURSE PRACTITIONER

## 2022-07-25 PROCEDURE — 3074F SYST BP LT 130 MM HG: CPT | Mod: CPTII,S$GLB,, | Performed by: NURSE PRACTITIONER

## 2022-07-25 PROCEDURE — G0101 PR CA SCREEN;PELVIC/BREAST EXAM: ICD-10-PCS | Mod: S$GLB,,, | Performed by: NURSE PRACTITIONER

## 2022-07-25 PROCEDURE — 4010F ACE/ARB THERAPY RXD/TAKEN: CPT | Mod: CPTII,S$GLB,, | Performed by: NURSE PRACTITIONER

## 2022-07-25 PROCEDURE — 99999 PR PBB SHADOW E&M-EST. PATIENT-LVL IV: CPT | Mod: PBBFAC,,, | Performed by: NURSE PRACTITIONER

## 2022-07-25 RX ORDER — PANTOPRAZOLE SODIUM 40 MG/1
40 TABLET, DELAYED RELEASE ORAL 2 TIMES DAILY
COMMUNITY
Start: 2022-06-14

## 2022-07-25 RX ORDER — ESTRADIOL 0.1 MG/G
0.5 CREAM VAGINAL
Qty: 42.5 G | Refills: 11 | Status: SHIPPED | OUTPATIENT
Start: 2022-07-25 | End: 2023-07-25

## 2022-07-25 NOTE — PROGRESS NOTES
"Subjective:       Patient ID: Ju Sinclair is a 62 y.o. female.    Chief Complaint:  Well Woman    No LMP recorded. Patient is postmenopausal.  History of Present Illness  Annual Exam-Postmenopausal  Patient presents for annual exam. The patient has no complaints today. The patient is not sexually active. GYN screening history: last pap: approximate date 19 and was normal and last mammogram: approximate date "around 2022 at [facility]" and was normal. The patient is not taking hormone replacement therapy. Patient denies post-menopausal vaginal bleeding. The patient wears seatbelts: yes. The patient participates in regular exercise: no. Has the patient ever been transfused or tattooed?: no. The patient reports that there is not domestic violence in her life.  Currently using Estrace 3 times per week for vaginal atrophy and dryness, doing well.  Requesting refill today.    OB History    Para Term  AB Living   2 2 2     2   SAB IAB Ectopic Multiple Live Births           2      # Outcome Date GA Lbr Vinicio/2nd Weight Sex Delivery Anes PTL Lv   2 Term 91    M CS-Unspec   LAURA   1 Term 80    M CS-Unspec   LAURA       Review of Systems  Review of Systems   Constitutional: Negative for appetite change, fatigue, fever and unexpected weight change.   Eyes: Negative for visual disturbance.   Cardiovascular: Negative for chest pain.   Gastrointestinal: Negative for abdominal pain, bloating, constipation, diarrhea, nausea and vomiting.   Genitourinary: Negative for bladder incontinence, dysmenorrhea, dyspareunia, dysuria, flank pain, frequency, genital sores, menorrhagia, menstrual problem, pelvic pain, urgency, vaginal bleeding, vaginal discharge, vaginal pain, postcoital bleeding, vaginal dryness and vaginal odor.   Integumentary:  Negative for rash, acne, mole/lesion, breast mass, nipple discharge, breast skin changes and breast tenderness.   Neurological: Negative for syncope and " headaches.   Hematological: Negative for adenopathy. Does not bruise/bleed easily.   All other systems reviewed and are negative.  Breast: Positive for breast self exam.Negative for asymmetry, lump, mass, nipple discharge, skin changes and tenderness           Objective:      Physical Exam:   Constitutional: She is oriented to person, place, and time. She appears well-developed and well-nourished.    HENT:   Head: Normocephalic and atraumatic.    Eyes: Pupils are equal, round, and reactive to light. Conjunctivae and EOM are normal.     Cardiovascular: Normal rate and regular rhythm.     Pulmonary/Chest: Effort normal. Right breast exhibits no inverted nipple, no mass, no nipple discharge, no skin change, no tenderness, no bleeding and no swelling. Left breast exhibits no inverted nipple, no mass, no nipple discharge, no skin change, no tenderness, no bleeding and no swelling. Breasts are symmetrical.        Abdominal: Soft. Hernia confirmed negative in the right inguinal area and confirmed negative in the left inguinal area.     Genitourinary:    Inguinal canal, vagina, uterus, right adnexa, left adnexa and rectum normal.      Pelvic exam was performed with patient supine.   The external female genitalia was normal.   Genitalia hair distrobution normal .   Labial bartholins normal.There is no rash, tenderness, lesion or injury on the right labia. There is no rash, tenderness, lesion or injury on the left labia. Cervix is normal. No erythema,  no vaginal discharge, bleeding, rectocele, cystocele or unspecified prolapse of vaginal walls in the vagina. Vaginal atrophy noted. Cervix exhibits no motion tenderness and no friability. Uterus is not tender.           Musculoskeletal: Normal range of motion and moves all extremeties.      Lymphadenopathy: No inguinal adenopathy noted on the right or left side.    Neurological: She is alert and oriented to person, place, and time.    Skin: Skin is warm and dry. No rash noted.  No erythema.    Psychiatric: She has a normal mood and affect. Her behavior is normal. Judgment and thought content normal.            Assessment:     1. Well woman exam with routine gynecological exam    2. Vaginal atrophy              Plan:   Ju was seen today for well woman.    Diagnoses and all orders for this visit:    Well woman exam with routine gynecological exam    Vaginal atrophy  -     estradioL (ESTRACE) 0.01 % (0.1 mg/gram) vaginal cream; Place 0.5 g vaginally every Mon, Wed, Fri.      Follow up in 2 years for well woman exam.

## 2022-11-30 ENCOUNTER — TELEPHONE (OUTPATIENT)
Dept: PODIATRY | Facility: CLINIC | Age: 62
End: 2022-11-30
Payer: MEDICARE

## 2022-11-30 DIAGNOSIS — M79.89 PAIN AND SWELLING OF RIGHT LOWER EXTREMITY: ICD-10-CM

## 2022-11-30 DIAGNOSIS — M79.604 PAIN AND SWELLING OF RIGHT LOWER EXTREMITY: ICD-10-CM

## 2022-11-30 DIAGNOSIS — M79.89 PAIN AND SWELLING OF LEFT LOWER LEG: ICD-10-CM

## 2022-11-30 DIAGNOSIS — I89.0 LYMPHEDEMA: Primary | ICD-10-CM

## 2022-11-30 DIAGNOSIS — M79.662 PAIN AND SWELLING OF LEFT LOWER LEG: ICD-10-CM

## 2024-08-07 ENCOUNTER — HOSPITAL ENCOUNTER (OUTPATIENT)
Dept: RADIOLOGY | Facility: HOSPITAL | Age: 64
Discharge: HOME | End: 2024-08-07
Attending: PODIATRIST
Payer: MEDICARE

## 2024-08-07 ENCOUNTER — OFFICE VISIT (OUTPATIENT)
Dept: PODIATRY | Facility: CLINIC | Age: 64
End: 2024-08-07
Payer: MEDICARE

## 2024-08-07 VITALS — WEIGHT: 233.69 LBS | HEIGHT: 66 IN | BODY MASS INDEX: 37.56 KG/M2

## 2024-08-07 DIAGNOSIS — M54.16 LUMBAR RADICULOPATHY, CHRONIC: ICD-10-CM

## 2024-08-07 DIAGNOSIS — M77.51 RETROCALCANEAL BURSITIS (BACK OF HEEL), RIGHT: ICD-10-CM

## 2024-08-07 DIAGNOSIS — M76.61 TENDONITIS, ACHILLES, RIGHT: ICD-10-CM

## 2024-08-07 DIAGNOSIS — M76.61 TENDONITIS, ACHILLES, RIGHT: Primary | ICD-10-CM

## 2024-08-07 DIAGNOSIS — M72.2 PLANTAR FASCIITIS OF RIGHT FOOT: ICD-10-CM

## 2024-08-07 DIAGNOSIS — I89.0 LYMPHEDEMA: ICD-10-CM

## 2024-08-07 PROCEDURE — 3008F BODY MASS INDEX DOCD: CPT | Mod: CPTII,S$GLB,, | Performed by: PODIATRIST

## 2024-08-07 PROCEDURE — 73610 X-RAY EXAM OF ANKLE: CPT | Mod: 26,RT,, | Performed by: RADIOLOGY

## 2024-08-07 PROCEDURE — 73610 X-RAY EXAM OF ANKLE: CPT | Mod: TC,RT

## 2024-08-07 PROCEDURE — 4010F ACE/ARB THERAPY RXD/TAKEN: CPT | Mod: CPTII,S$GLB,, | Performed by: PODIATRIST

## 2024-08-07 PROCEDURE — 99999 PR PBB SHADOW E&M-EST. PATIENT-LVL IV: CPT | Mod: PBBFAC,,, | Performed by: PODIATRIST

## 2024-08-07 PROCEDURE — 1101F PT FALLS ASSESS-DOCD LE1/YR: CPT | Mod: CPTII,S$GLB,, | Performed by: PODIATRIST

## 2024-08-07 PROCEDURE — 3288F FALL RISK ASSESSMENT DOCD: CPT | Mod: CPTII,S$GLB,, | Performed by: PODIATRIST

## 2024-08-07 PROCEDURE — 99214 OFFICE O/P EST MOD 30 MIN: CPT | Mod: S$GLB,,, | Performed by: PODIATRIST

## 2024-08-07 PROCEDURE — 1159F MED LIST DOCD IN RCRD: CPT | Mod: CPTII,S$GLB,, | Performed by: PODIATRIST

## 2024-08-07 RX ORDER — DULOXETIN HYDROCHLORIDE 60 MG/1
60 CAPSULE, DELAYED RELEASE ORAL
COMMUNITY

## 2024-08-07 RX ORDER — DICLOFENAC SODIUM 10 MG/G
2 GEL TOPICAL DAILY
Qty: 3 EACH | Refills: 3 | Status: SHIPPED | OUTPATIENT
Start: 2024-08-07 | End: 2024-09-26

## 2024-08-07 RX ORDER — METHYLPREDNISOLONE 4 MG/1
TABLET ORAL
Qty: 1 EACH | Refills: 0 | Status: SHIPPED | OUTPATIENT
Start: 2024-08-07

## 2024-09-17 ENCOUNTER — OFFICE VISIT (OUTPATIENT)
Dept: PODIATRY | Facility: CLINIC | Age: 64
End: 2024-09-17
Payer: MEDICARE

## 2024-09-17 VITALS — HEIGHT: 66 IN | WEIGHT: 233.69 LBS | BODY MASS INDEX: 37.56 KG/M2

## 2024-09-17 DIAGNOSIS — M76.61 TENDONITIS, ACHILLES, RIGHT: Primary | ICD-10-CM

## 2024-09-17 DIAGNOSIS — I89.0 LYMPHEDEMA: ICD-10-CM

## 2024-09-17 DIAGNOSIS — M77.51 RETROCALCANEAL BURSITIS (BACK OF HEEL), RIGHT: ICD-10-CM

## 2024-09-17 PROCEDURE — 3008F BODY MASS INDEX DOCD: CPT | Mod: CPTII,S$GLB,, | Performed by: PODIATRIST

## 2024-09-17 PROCEDURE — 99999 PR PBB SHADOW E&M-EST. PATIENT-LVL III: CPT | Mod: PBBFAC,,, | Performed by: PODIATRIST

## 2024-09-17 PROCEDURE — 1125F AMNT PAIN NOTED PAIN PRSNT: CPT | Mod: CPTII,S$GLB,, | Performed by: PODIATRIST

## 2024-09-17 PROCEDURE — 4010F ACE/ARB THERAPY RXD/TAKEN: CPT | Mod: CPTII,S$GLB,, | Performed by: PODIATRIST

## 2024-09-17 PROCEDURE — 99213 OFFICE O/P EST LOW 20 MIN: CPT | Mod: S$GLB,,, | Performed by: PODIATRIST

## 2024-09-17 PROCEDURE — 1159F MED LIST DOCD IN RCRD: CPT | Mod: CPTII,S$GLB,, | Performed by: PODIATRIST

## 2024-09-17 PROCEDURE — 1101F PT FALLS ASSESS-DOCD LE1/YR: CPT | Mod: CPTII,S$GLB,, | Performed by: PODIATRIST

## 2024-09-17 PROCEDURE — 3288F FALL RISK ASSESSMENT DOCD: CPT | Mod: CPTII,S$GLB,, | Performed by: PODIATRIST

## 2024-09-17 NOTE — PATIENT INSTRUCTIONS
You have been prescribed compression stockings. You can take the prescription to any one of the below locations to have it filled.     You can also purchase Compression stockings: BELOW KNEE 20mmhg-30 mmhg    Wear first thing in the morning and then ok to remove before bed.    Putting on Compression Stockings              Elastic compression stockings are prescribed to treat many vein problems. Wearing them may be the most important thing you do to manage your symptoms. The stockings fit tightly around your ankle, gradually reducing in pressure as they go up your legs. This helps keep blood flowing to your heart. As a result, swelling is reduced. Your doctor will prescribe stockings at a safe pressure for you. He or she will also tell you how often to wear and remove the stockings. Follow these instructions closely. Also, do not buy or wear compression stockings without first seeing your doctor.      Tips for Wear and Care  To wear stockings safely and to get the most benefit:  Wear the length prescribed by your doctor.  Pull them to the designated height and no farther. Dont let them bunch at the top. This can restrict blood flow and increase swelling.  Wear the stockings for the amount of time your doctor recommends. Replace them when they start to feel loose. This will likely be every 3 to 6 months.  Remove them as your doctor directs. When removed, wash your legs. Then check your legs and feet for sores. Call your doctor if you find a sore. Dont put the stockings back on unless your doctor directs.  Wash the stockings as instructed. They may need to be handwashed.  © 9532-0213 [employer]. [address] [place] [address], [place] [address]. All rights reserved. This information is not intended as a substitute for professional medical care. Always follow your healthcare professional's instructions.      [facility]    Address  [address]  [place] [address]    Contact  Phone: [phone]  Toll Free: [phone]  Fax: [phone]     Knee scooters for rent: Appx $50.00/month; Most insurances are accepted    [email or web address]

## 2024-09-23 ENCOUNTER — CLINICAL SUPPORT (OUTPATIENT)
Dept: REHABILITATION | Facility: HOSPITAL | Age: 64
End: 2024-09-23
Attending: PODIATRIST
Payer: MEDICARE

## 2024-09-23 DIAGNOSIS — M76.61 TENDONITIS, ACHILLES, RIGHT: ICD-10-CM

## 2024-09-23 DIAGNOSIS — M25.672 DECREASED RANGE OF MOTION OF BOTH ANKLES: Primary | ICD-10-CM

## 2024-09-23 DIAGNOSIS — M77.51 RETROCALCANEAL BURSITIS (BACK OF HEEL), RIGHT: ICD-10-CM

## 2024-09-23 DIAGNOSIS — R53.1 DECREASED STRENGTH: ICD-10-CM

## 2024-09-23 DIAGNOSIS — Z74.09 DECREASED FUNCTIONAL MOBILITY AND ENDURANCE: ICD-10-CM

## 2024-09-23 DIAGNOSIS — M25.671 DECREASED RANGE OF MOTION OF BOTH ANKLES: Primary | ICD-10-CM

## 2024-09-23 PROCEDURE — 97110 THERAPEUTIC EXERCISES: CPT

## 2024-09-23 PROCEDURE — 97163 PT EVAL HIGH COMPLEX 45 MIN: CPT

## 2024-09-23 NOTE — PLAN OF CARE
"[facility]   Physical Therapy Initial Evaluation      Date: 9/23/2024   Name: Ju Sinclair  Clinic Number: [id number]    Therapy Diagnosis:    Encounter Diagnoses   Name Primary?    Tendonitis, Achilles, right     Retrocalcaneal bursitis (back of heel), right     Decreased range of motion of both ankles Yes    Decreased strength     Decreased functional mobility and endurance       Physician: Roselyn Sam DPM     Physician Orders: Physical Therapy Evaluation and Treat  Medical Diagnosis from Referral:   Diagnosis   M76.61 (ICD-10-CM) - Tendonitis, Achilles, right   M77.51 (ICD-10-CM) - Retrocalcaneal bursitis (back of heel), right   Evaluation Date: 9/23/2024  Authorization Period Expiration: 09/17/2025  Plan of Care Expiration: 12/16/ 2024  Progress Note Due: 10/23/2024  Visit # / Visits authorized: 1/1   FOTO: 1/3     Precautions: Standard and Diabetes; High Blood Pressure; Osteoporosis     Time In: 1:00 PM  Time Out: 1:40 PM  Total Billable Time (timed & untimed codes): 40 minutes    SUBJECTIVE   Date of onset: months prior    History of current condition - Ju reports: right heel pain > left  and reports having x-rays and found a "possible medial malleolus fracture" and a "heel spur." Patient states she stayed in boot for about 4-5 weeks but the pain has remained unchanged so her doctor stated she did not need to wear it if it does not affect her pain. Patient also reports history of bilateral lymphedema and states she has not been complaint with her compression at all times as she states sometimes the compression hurts her feet. Patient states her pain is unbearable and she tries to avoid walking at all costs.    Falls: none    Exercise Regimen: none    Imaging: [x] Xray [] MRI [] CT: Performed on: 08/07/2024    Pain:  Current 7/10, worst 9/10, best 6/10   Location: [x] Right   [] Left:  right heel/achilles > left   Description: Aching, Throbbing, Grabbing, and " Tight  Aggravating Factors: any movement involving foot  Easing Factors: activity avoidance, rest,     Prior Therapy:   [] N/A    [x] Yes:   Social History: Patient lives with their family  Occupation: Patient is not currently working   Prior Level of Function: Independent and pain free with all ADL, IADL, community mobility and functional activities.   Current Level of Function: Independent with all ADL, IADL, community mobility and functional activities with reports of increased pain and need for increased time and frequent breaks.      Dominant Extremity:    [x] Right    [] Left      Medical History:   Past Medical History:   Diagnosis Date    Anxiety disorder     Breast disorder     Hyperlipidemia 2018    Hypertension     Mental disorder     Neuropathy     Sleep apnea        Surgical History:   Ju Sinclair  has a past surgical history that includes  section; Shoulder surgery; and Tonsillectomy.    Medications:   Ju has a current medication list which includes the following prescription(s): amlodipine, aspirin, calcium carbonate-vitamin d3, cyclobenzaprine, diazepam, diclofenac sodium, diclofenac sodium, doxepin, duloxetine, estradiol, gabapentin, hydrochlorothiazide, hydrochlorothiazide, hydrocodone-acetaminophen, hydroxyzine hcl, losartan, losartan, lovastatin, methylprednisolone, metoprolol succinate, metoprolol tartrate, mirtazapine, adult multivitamin extra vitd3, nitroglycerin, oxycodone 10 mg/0.5 ml oral conc, pantoprazole, pyridoxine (vitamin b6), ropinirole, and venlafaxine.    Allergies:   Review of patient's allergies indicates:   Allergen Reactions    Amlodipine besylate Anaphylaxis    Atorvastatin calcium      Other reaction(s): muscle pain    Penicillin      Other reaction(s): break out    Tramadol hcl      Other reaction(s): sick feeling    Meloxicam Palpitations and Rash    Pcn [penicillins] Hives    Tramadol Palpitations        OBJECTIVE     Sensation:  [x] Intact to Light  Touch   [] Impaired:    Palpation: Increased tone and tenderness noted with palpation of bilateral medial malleoli, bilateral medial ankle musculature, bilateral achilles tendon, bilateral heel    Posture: Patient presents with postural abnormalities which include:    [x] Forward Head   [] Increased Lumbar Lordosis   [x] Rounded Shoulder   [] Genu Recurvatum   [] Increased Thoracic Kyphosis [] Genu Valgus   [] Trunk Deviated    [] Pes Planus   [] Scapular Winging    [x] Other: lymphedema noted bilaterally    Gait Analysis: The patient ambulated with the following assistive device: none; the pt presents with the following gait abnormalities: trendelenburg, bradykinetic, increased NELLY, and decreased stance time on right    RANGE OF MOTION:    Ankle/Foot AROM/PROM Right Left Pain/Dysfunction with Movement Goal   Dorsiflexion (20) 10 12 Pain in bilateral heels 20 bilateral   Plantarflexion (50) 35 38 Pain in bilateral heels 50 bilateral   Inversion (35) 8 12 Pain in bilateral heels 25 bilateral   Eversion (15) 10 10 Pain in bilateral heels 15 bilateral       STRENGTH:    L/E MMT Right   Left Pain/Dysfunction with Movement Goal   Ankle Dorsiflexion 3-/5 3-/5 Pain in bilateral heels 5/5 Bilateral   Ankle Plantarflexion 3-/5 3-/5 Pain in bilateral heels 5/5 Bilateral   Ankle Inversion 3-/5 3-/5 Pain in bilateral heels 5/5 Bilateral   Ankle Eversion 3-/5 3-/5 Pain in bilateral heels 5/5 Bilateral       MUSCLE LENGTH:     Muscle Tested  Right Left  Goal   Gastrocnemius  decreased decreased Normal Bilateral   Soleus  decreased decreased Normal Bilateral       JOINT MOBILITY:     Joint Motion Tested Right   Left  Goal   Talocrural  Hypomobile Hypomobile Normal Bilateral     FUNCTION:     CMS Impairment/Limitation/Restriction for FOTO Ankle Survey    Therapist reviewed FOTO scores for Ju on 9/23/2024.   FOTO documents entered into [employer] - see Media section.    Intake Score: 42         TREATMENT       Ju received the  treatments listed below:        CPT Intervention Performed  9/23/2024  Duration / Intensity     TE  Patient educated on plan of care and current condition x Patient verbalized understanding                           NMR                                                    TA                                                               MT        PLAN  Nu-step, toe yoga, marble pick ups, arch doming, towel scrunches, BAPS board, LONG ARC QUADs, ankle 4 ways, talocrural distractions            CPT Codes available for Billing:   (00) minutes of Manual therapy (MT) to improve pain and ROM.  (25) minutes of Therapeutic Exercise (TE) to develop strength, endurance, range of motion, and flexibility.  (00) minutes of Neuromuscular Re-Education (NMR)  to improve: Balance, Coordination, Kinesthetic, Sense, Proprioception, and Posture.  (00) minutes of Therapeutic Activities (TA) to improve functional performance.  Unattended Electrical Stimulation (ES) for muscle performance or pain modulation.  BFR: Blood flow restriction applied during exercise  NP or (-): Not Performed      PATIENT EDUCATION AND HOME EXERCISES     Education provided: (25) minutes  PURPOSE: Patient educated on the impairments noted above and the effects of physical therapy intervention to improve overall condition and QOL.   EXERCISE: Patient was educated on all the above exercise prior/during/after for proper posture, positioning, and execution for safe performance with home exercise program.   STRENGTH: Patient educated on the importance of improved core and extremity strength in order to improve alignment of the spine and extremities with static positions and dynamic movement.     Written Home Exercises Provided: no.  Exercises were reviewed and Ju was able to demonstrate them prior to the end of the session.  Ju demonstrated good  understanding of the education provided. See EMR under Patient Instructions for exercises provided during therapy  sessions.    ASSESSMENT     Ju is a 64 y.o. female referred to outpatient Physical Therapy with a medical diagnosis of Tendonitis, Achilles, right and Retrocalcaneal bursitis (back of heel), right . Patient presents with impairments including: impairments list: ROM, strength, endurance, joint mobility, muscle length, balance, posture, functional movement patterns, and edema.    Patient prognosis is Guarded.   Patient will benefit from skilled outpatient Physical Therapy to address the deficits stated above and in the chart below, provide patient/family education, and to maximize patient's level of independence.     Plan of care discussed with patient: Yes  Patient's spiritual, cultural and educational needs considered and patient is agreeable to the plan of care and goals as stated below:     Anticipated Barriers for therapy: co-morbidities, sedentary lifestyle, chronicity of condition, and adherence to treatment plan    Medical Necessity is demonstrated by the following   History  Co-morbidities and personal factors that may impact the plan of care [] LOW: no personal factors / co-morbidities  [] MODERATE: 1-2 personal factors / co-morbidities  [x] HIGH: 3+ personal factors / co-morbidities    Moderate / High Support Documentation:   Past Medical History:   Diagnosis Date    Anxiety disorder     Breast disorder     Hyperlipidemia 5/14/2018    Hypertension     Mental disorder     Neuropathy     Sleep apnea          Examination  Body Structures and Functions, activity limitations and participation restrictions that may impact the plan of care [] LOW: addressing 1-2 elements  [] MODERATE: 3+ elements  [x] HIGH: 4+ elements (please support below)    Moderate / High Support Documentation: See evaluation / objective measurements above and FOTO.     Clinical Presentation [] LOW: stable  [] MODERATE: Evolving  [x] HIGH: Unstable     Decision Making/ Complexity Score: high         Goals:  Reviewed:9/23/2024      Short  Term Goals:  6 weeks Status  Date Met   PAIN: Patient will report worst pain of 5/10 in order to progress toward max functional ability and improve quality of life. [x] Progressing  [] Met  [] Not Met    FUNCTION: Patient will demonstrate improved function as indicated by a functional intake score of more than or equal to 51 out of 100 on FOTO. [x] Progressing  [] Met  [] Not Met    MOBILITY: Patient will improve Range of Motion to 50% of stated goals, listed in objective measures above, in order to progress towards independence with functional activities.  [x] Progressing  [] Met  [] Not Met    STRENGTH: Patient will improve strength to 50% of stated goals, listed in objective measures above, in order to progress towards independence with functional activities.  [x] Progressing  [] Met  [] Not Met      Long Term Goals:  12 weeks Status Date Met   PAIN: Patient will report worst pain of 4/10 in order to progress toward max functional ability and improve quality of life [x] Progressing  [] Met  [] Not Met    FUNCTION: Patient will demonstrate improved function as indicated by a functional intake score of more than or equal to 60 out of 100 on FOTO. [x] Progressing  [] Met  [] Not Met    MOBILITY: Patient will improve Range of Motion to stated goals, listed in objective measures above, in order to return to maximal functional potential and improve quality of life. [x] Progressing  [] Met  [] Not Met    STRENGTH: Patient will improve strength to stated goals, listed in objective measures above, in order to improve functional independence and quality of life. [x] Progressing  [] Met  [] Not Met    GAIT: Patient will demonstrate normalized gait mechanics with minimal compensation in order to return to prior level of function. [x] Progressing  [] Met  [] Not Met    Patient will return to normal ADL's, IADL's, community involvement, recreational activities, and work-related activities with less than or equal to 4/10 pain  and maximal function.  [x] Progressing  [] Met  [] Not Met      PLAN   Plan of Care Certification: 9/23/2024 to 12/16/2024.    Outpatient Physical Therapy 2 times weekly for 12 weeks to include any combination of the following interventions: virtual visits, dry needling, modalities, electrical stimulation (IFC, Pre-Mod, Attended with Functional Dry Needling), Cervical/Lumbar Traction, Gait Training, Manual Therapy, Moist Heat/ Ice, Neuromuscular Re-ed, Patient Education, Self Care, Therapeutic Exercise, and Therapeutic Activites     Mildred Cheung, PT, DPT      Physician's Signature: ___________________________________________________

## 2024-09-25 ENCOUNTER — TELEPHONE (OUTPATIENT)
Dept: SPORTS MEDICINE | Facility: CLINIC | Age: 64
End: 2024-09-25
Payer: MEDICARE

## 2024-09-25 ENCOUNTER — CLINICAL SUPPORT (OUTPATIENT)
Dept: REHABILITATION | Facility: HOSPITAL | Age: 64
End: 2024-09-25
Payer: MEDICARE

## 2024-09-25 DIAGNOSIS — M25.672 DECREASED RANGE OF MOTION OF BOTH ANKLES: Primary | ICD-10-CM

## 2024-09-25 DIAGNOSIS — M25.522 PAIN OF BOTH ELBOWS: Primary | ICD-10-CM

## 2024-09-25 DIAGNOSIS — Z74.09 DECREASED FUNCTIONAL MOBILITY AND ENDURANCE: ICD-10-CM

## 2024-09-25 DIAGNOSIS — R53.1 DECREASED STRENGTH: ICD-10-CM

## 2024-09-25 DIAGNOSIS — M25.521 PAIN OF BOTH ELBOWS: Primary | ICD-10-CM

## 2024-09-25 DIAGNOSIS — M25.671 DECREASED RANGE OF MOTION OF BOTH ANKLES: Primary | ICD-10-CM

## 2024-09-25 PROCEDURE — 97140 MANUAL THERAPY 1/> REGIONS: CPT

## 2024-09-25 PROCEDURE — 97112 NEUROMUSCULAR REEDUCATION: CPT

## 2024-09-25 NOTE — PROGRESS NOTES
[facility]   Physical Therapy Treatment Note        Name: Ju Sinclair  [facility] Number: [id number]    Therapy Diagnosis:   Encounter Diagnoses   Name Primary?    Decreased range of motion of both ankles Yes    Decreased strength     Decreased functional mobility and endurance      Physician: Roselyn Sam DPM    Visit Date: 9/25/2024    Physician Orders: Physical Therapy Evaluation and Treat  Medical Diagnosis from Referral:   Diagnosis   M76.61 (ICD-10-CM) - Tendonitis, Achilles, right   M77.51 (ICD-10-CM) - Retrocalcaneal bursitis (back of heel), right   Evaluation Date: 9/23/2024  Authorization Period Expiration: 09/17/2025  Plan of Care Expiration: 12/16/ 2024  Progress Note Due: 10/23/2024  Visit # / Visits authorized: 1/10   FOTO: 1/3      Precautions: Standard and Diabetes; High Blood Pressure; Osteoporosis     Time In: 2:00 PM  Time Out: 2:55 PM  Total Billable Time: 25 minutes  (Billing reflects 1 on 1 treatment time spent with patient)    SUBJECTIVE     Patient reports: she is doing about the same today and reports no new complaints.  She was compliant with home exercise program.  Response to previous treatment: no adverse effects  Functional change: none noted yet    Pain: 7/10  Location:   [x] Right   [] Left:  right heel/achilles > left     OBJECTIVE     Objective Measures updated at progress report unless specified.     TREATMENT       CPT Intervention Performed  9/25/2024  Duration / Intensity     TE  Nu-step x 6 minutes level 1 LOWER EXTREMITY only                           NMR  toe yoga X  x  3 x 10 big toes   3 x 10 little toes      marble pick ups x  3 minutes each bilateral      towel scrunches x  3 x 10 bilateral     BAPS Board level 1 X  x  1 x 15 CW bilateral   1 x 15 CCW bilateral      ankle 4 ways         LONG ARC QUADs      TA                                                               MT  talocrural distractions        SOFT TISSUE MOBILIZATION to  bilateral gastrocnemius x 10 minutes   PLAN   progress as tolerated            CPT Codes available for Billing:   (10) minutes of Manual therapy (MT) to improve pain and ROM.  (06) minutes of Therapeutic Exercise (TE) to develop strength, endurance, range of motion, and flexibility.  (39) minutes of Neuromuscular Re-Education (NMR)  to improve: Balance, Coordination, Kinesthetic, Sense, Proprioception, and Posture.  (00) minutes of Therapeutic Activities (TA) to improve functional performance.  Unattended Electrical Stimulation (ES) for muscle performance or pain modulation.  BFR: Blood flow restriction applied during exercise  NP or (-): Not Performed    PATIENT EDUCATION AND HOME EXERCISES     Home Exercises Provided and Patient Education Provided     Education provided:   PURPOSE: Patient educated on the impairments noted above and the effects of physical therapy intervention to improve overall condition and QOL.   EXERCISE: Patient was educated on all the above exercise prior/during/after for proper posture, positioning, and execution for safe performance with home exercise program.   STRENGTH: Patient educated on the importance of improved core and extremity strength in order to improve alignment of the spine and extremities with static positions and dynamic movement.     Written Home Exercises Provided: Patient instructed to cont prior HEP. Exercises were reviewed and Ju was able to demonstrate them prior to the end of the session.  Ju demonstrated good  understanding of the education provided. See EMR under Patient Instructions for exercises provided during therapy sessions      ASSESSMENT   Patient tolerated first treatment session well. Patient did struggle with ankle control as opposed to toe intrinsic muscular control. Patient also noted to have increased tissue tension in left gastrocnemius as compared to right. Plan to continue monitoring symptoms and progressing as tolerated.    Ju Is  progressing well towards her goals.   Patient prognosis is Guarded.     Patient will continue to benefit from skilled outpatient physical therapy to address the deficits listed in the problem list box on initial evaluation, provide patient/family education and to maximize patient's level of independence in the home and community environment.     Patient's spiritual, cultural and educational needs considered and patient agreeable to plan of care and goals.     Anticipated barriers to physical therapy: co-morbidities, sedentary lifestyle, chronicity of condition, and adherence to treatment plan     Goals:   Reviewed: 9/25/2024      Short Term Goals:  6 weeks Status  Date Met   PAIN: Patient will report worst pain of 5/10 in order to progress toward max functional ability and improve quality of life. [x] Progressing  [] Met  [] Not Met     FUNCTION: Patient will demonstrate improved function as indicated by a functional intake score of more than or equal to 51 out of 100 on FOTO. [x] Progressing  [] Met  [] Not Met     MOBILITY: Patient will improve Range of Motion to 50% of stated goals, listed in objective measures above, in order to progress towards independence with functional activities.  [x] Progressing  [] Met  [] Not Met     STRENGTH: Patient will improve strength to 50% of stated goals, listed in objective measures above, in order to progress towards independence with functional activities.  [x] Progressing  [] Met  [] Not Met        Long Term Goals:  12 weeks Status Date Met   PAIN: Patient will report worst pain of 4/10 in order to progress toward max functional ability and improve quality of life [x] Progressing  [] Met  [] Not Met     FUNCTION: Patient will demonstrate improved function as indicated by a functional intake score of more than or equal to 60 out of 100 on FOTO. [x] Progressing  [] Met  [] Not Met     MOBILITY: Patient will improve Range of Motion to stated goals, listed in objective measures  above, in order to return to maximal functional potential and improve quality of life. [x] Progressing  [] Met  [] Not Met     STRENGTH: Patient will improve strength to stated goals, listed in objective measures above, in order to improve functional independence and quality of life. [x] Progressing  [] Met  [] Not Met     GAIT: Patient will demonstrate normalized gait mechanics with minimal compensation in order to return to prior level of function. [x] Progressing  [] Met  [] Not Met     Patient will return to normal ADL's, IADL's, community involvement, recreational activities, and work-related activities with less than or equal to 4/10 pain and maximal function.  [x] Progressing  [] Met  [] Not Met          PLAN     Monitor response to today's treatment session and progress with Physical Therapy plan of care as indicated.    Mildred Cheung, PT

## 2024-09-27 ENCOUNTER — OFFICE VISIT (OUTPATIENT)
Dept: SPORTS MEDICINE | Facility: CLINIC | Age: 64
End: 2024-09-27
Payer: MEDICARE

## 2024-09-27 ENCOUNTER — HOSPITAL ENCOUNTER (OUTPATIENT)
Dept: RADIOLOGY | Facility: HOSPITAL | Age: 64
Discharge: HOME | End: 2024-09-27
Attending: STUDENT IN AN ORGANIZED HEALTH CARE EDUCATION/TRAINING PROGRAM
Payer: MEDICARE

## 2024-09-27 DIAGNOSIS — M25.522 PAIN OF BOTH ELBOWS: ICD-10-CM

## 2024-09-27 DIAGNOSIS — M19.021 PRIMARY OSTEOARTHRITIS OF ELBOWS, BILATERAL: Primary | ICD-10-CM

## 2024-09-27 DIAGNOSIS — M25.522 BILATERAL ELBOW JOINT PAIN: ICD-10-CM

## 2024-09-27 DIAGNOSIS — M25.521 PAIN OF BOTH ELBOWS: ICD-10-CM

## 2024-09-27 DIAGNOSIS — M25.521 BILATERAL ELBOW JOINT PAIN: ICD-10-CM

## 2024-09-27 DIAGNOSIS — M19.022 PRIMARY OSTEOARTHRITIS OF ELBOWS, BILATERAL: Primary | ICD-10-CM

## 2024-09-27 PROCEDURE — 99999 PR PBB SHADOW E&M-EST. PATIENT-LVL II: CPT | Mod: PBBFAC,,, | Performed by: STUDENT IN AN ORGANIZED HEALTH CARE EDUCATION/TRAINING PROGRAM

## 2024-09-27 PROCEDURE — 73080 X-RAY EXAM OF ELBOW: CPT | Mod: 26,50,, | Performed by: RADIOLOGY

## 2024-09-27 PROCEDURE — 73080 X-RAY EXAM OF ELBOW: CPT | Mod: TC,50,FY,PO

## 2024-09-27 RX ORDER — TRAZODONE HYDROCHLORIDE 100 MG/1
100 TABLET ORAL NIGHTLY
COMMUNITY

## 2024-09-27 RX ORDER — ROSUVASTATIN CALCIUM 5 MG/1
1 TABLET, COATED ORAL DAILY
COMMUNITY
Start: 2024-07-31 | End: 2024-10-29

## 2024-09-27 RX ORDER — ESCITALOPRAM OXALATE 10 MG/1
TABLET ORAL
COMMUNITY
Start: 2024-09-13

## 2024-09-27 RX ORDER — CELECOXIB 100 MG/1
100 CAPSULE ORAL 2 TIMES DAILY PRN
Qty: 60 CAPSULE | Refills: 2 | Status: SHIPPED | OUTPATIENT
Start: 2024-09-27

## 2024-09-27 NOTE — PROGRESS NOTES
Patient ID: Ju Sinclair  YOB: 1960  MRN: [id number]    Chief Complaint: Pain of the Left Elbow and Pain of the Right Elbow    Referred By: Self    Occupation:  Unemployed    History of Present Illness: Ju Sinclair is a right-hand dominant 64 y.o. female who presents today with Pain of the Left Elbow and Pain of the Right Elbow    She complains of chronic bilateral olecranon and posterior elbow pain that began sometime approximately in early  without incident or injury.  She describes pain with direct pressure on the points of her elbows.  She has had blanching of the skin and dry, rashed skin in this area as well.  She denies any radicular symptoms or mechanical symptoms.  No prior injuries.    Past Medical History:   Past Medical History:   Diagnosis Date    Anxiety disorder     Breast disorder     Hyperlipidemia 2018    Hypertension     Mental disorder     Neuropathy     Sleep apnea      Past Surgical History:   Procedure Laterality Date     SECTION      SHOULDER SURGERY      TONSILLECTOMY       Family History   Problem Relation Name Age of Onset    Eclampsia Paternal Grandmother      Diabetes Paternal Grandmother      Breast cancer Sister      Colon cancer Maternal Grandfather      Breast cancer Maternal Aunt      Ovarian cancer Neg Hx      Cancer Neg Hx      Hypertension Neg Hx      Miscarriages / Stillbirths Neg Hx       labor Neg Hx      Stroke Neg Hx       Social History     Socioeconomic History    Marital status:    Tobacco Use    Smoking status: Never    Smokeless tobacco: Never   Substance and Sexual Activity    Alcohol use: No     Alcohol/week: 0.0 standard drinks of alcohol    Drug use: No    Sexual activity: Not Currently     Partners: Male     Birth control/protection: None     Medication List with Changes/Refills   New Medications    CELECOXIB (CELEBREX) 100 MG CAPSULE    Take 1 capsule (100 mg total) by mouth 2 (two) times daily as  needed for Pain.   Current Medications    AMLODIPINE (NORVASC) 10 MG TABLET    Take 1 tablet (10 mg total) by mouth once daily.    ASPIRIN (ECOTRIN) 81 MG EC TABLET    1 tablet    CALCIUM CARBONATE-VITAMIN D3 1,000 MG(2,500 MG)-800 UNIT TAB    1 tablet    CYCLOBENZAPRINE (FLEXERIL) 5 MG TABLET    1 tablet at bedtime as needed    DIAZEPAM (VALIUM) 1 MG/ML LIQUID (PEDS)    by NOT APPLICABLE route.    DICLOFENAC SODIUM (VOLTAREN ARTHRITIS PAIN) 1 % GEL    Apply 2 g topically once daily.    DICLOFENAC SODIUM (VOLTAREN) 1 % GEL    as directed    DOXEPIN (ZONALON) 5 % CREAM        DULOXETINE (CYMBALTA) 60 MG CAPSULE    Take 60 mg by mouth.    ESCITALOPRAM OXALATE (LEXAPRO) 10 MG TABLET    TAKE 1/2 TAB FOR ONE WEEK THEN ONE TAB PER DAY ORALLY ONCE A DAY 30 DAYS    ESTRADIOL (ESTRACE) 0.01 % (0.1 MG/GRAM) VAGINAL CREAM    Place 0.5 g vaginally every Mon, Wed, Fri.    GABAPENTIN (NEURONTIN) 100 MG CAPSULE    1 cap(s) By Mouth At Bedtime    HYDROCHLOROTHIAZIDE (HYDRODIURIL) 25 MG TABLET    Take 25 mg by mouth once daily.    HYDROCHLOROTHIAZIDE (MICROZIDE) 12.5 MG CAPSULE    hydrochlorothiazide Take No date recorded No form recorded No frequency recorded No route recorded No set duration recorded No set duration amount recorded active No dosage strength recorded No dosage strength units of measure recorded    HYDROCODONE-ACETAMINOPHEN (NORCO) 7.5-325 MG PER TABLET    TAKE 1 TABLET BY MOUTH EVERY 12 HOURS AS NEEDED FOR 30 DAYS (PA WAITING)    HYDROXYZINE HCL (ATARAX) 10 MG TAB    TAKE 1 TABLET BY MOUTH ONCE A DAY AS NEEDED TAKE ONLY FOR ITCH SYMPTOMS/ DO NOT DRIVE ON MED    LOSARTAN (COZAAR) 100 MG TABLET    losartan Take No date recorded No form recorded No frequency recorded No route recorded No set duration recorded No set duration amount recorded active No dosage strength recorded No dosage strength units of measure recorded    LOSARTAN (COZAAR) 50 MG TABLET    Take 50 mg by mouth once daily.    LOVASTATIN (MEVACOR) 10 MG  TABLET    lovastatin Take No date recorded No form recorded No frequency recorded No route recorded No set duration recorded No set duration amount recorded suspended No dosage strength recorded No dosage strength units of measure recorded    METHYLPREDNISOLONE (MEDROL DOSEPACK) 4 MG TABLET    use as directed    METOPROLOL SUCCINATE (TOPROL-XL) 50 MG 24 HR TABLET    Take 1 tablet (50 mg total) by mouth once daily.    METOPROLOL TARTRATE (LOPRESSOR) 100 MG TABLET    metoprolol tartrate Take No date recorded No form recorded No frequency recorded No route recorded No set duration recorded No set duration amount recorded active No dosage strength recorded No dosage strength units of measure recorded    MIRTAZAPINE (REMERON) 15 MG TABLET    TAKE 1 TABLET AT BEDTIME ONCE A DAY ORALLY    MULTIVIT WITH MIN-FOLIC ACID (ADULT MULTIVITAMIN EXTRA VITD3) 200 MCG CHEW    as directed    NITROGLYCERIN (NITROSTAT) 0.4 MG SL TABLET    as directed    OXYCODONE HCL (OXYCODONE 10 MG/0.5 ML ORAL CONC) 10 MG/0.5 ML SYRG    oxycodone Take No date recorded No form recorded No frequency recorded No route recorded No set duration recorded No set duration amount recorded suspended No dosage strength recorded No dosage strength units of measure recorded    PANTOPRAZOLE (PROTONIX) 40 MG TABLET    Take 40 mg by mouth 2 (two) times daily.    PYRIDOXINE, VITAMIN B6, (B-6) 100 MG TAB    1 tablet    ROPINIROLE (REQUIP) 0.5 MG TABLET    1 tab(s) By Mouth As Needed    ROSUVASTATIN (CRESTOR) 5 MG TABLET    Take 1 tablet by mouth once daily.    TRAZODONE (DESYREL) 100 MG TABLET    Take 100 mg by mouth every evening.    VENLAFAXINE (EFFEXOR-XR) 150 MG CP24    TAKE 1 CAPSULE BY MOUTH EVERY DAY WITH FOOD     Review of patient's allergies indicates:   Allergen Reactions    Amlodipine besylate Anaphylaxis    Atorvastatin calcium      Other reaction(s): muscle pain    Penicillin      Other reaction(s): break out    Tramadol hcl      Other reaction(s): sick  feeling    Meloxicam Palpitations and Rash    Pcn [penicillins] Hives    Tramadol Palpitations       Physical Exam:   There is no height or weight on file to calculate BMI.    Detailed MSK exam:     Right Elbow:  Inspection:  Dry skin of the olecranon  Palpation tenderness: None  Range of motion: Full ROM    Strength:  5/5 Flexion    5/5 Extension    5/5 Supination     5/5 Pronation  N/V Exam:  Radial: Normal motor (EPL/thumbs up)              Normal sensory (dorsal hand)   Median: Normal motor (FPL/A-OK)      Normal sensory (thumb)   Ulnar:  Normal motor (Interossei/scissors-spread)     Normal sensory (5th finger)   LABC: Normal sensory (lateral forearm)   MABC: Normal sensory (medial forearm)   MC: Normal motor (elbow flexion)   Axillary: Normal motor/sensory (deltoid)  Normal radial and ulnar pulses, warm and well perfused with capillary refill < 2 sec       Left Elbow:  Inspection:  Dry skin of the olecranon  Palpation tenderness: None  Range of motion: Full ROM    Strength:  5/5 Flexion    5/5 Extension    5/5 Supination     5/5 Pronation  N/V Exam:  Radial: Normal motor (EPL/thumbs up)              Normal sensory (dorsal hand)   Median: Normal motor (FPL/A-OK)      Normal sensory (thumb)   Ulnar:  Normal motor (Interossei/scissors-spread)     Normal sensory (5th finger)   LABC: Normal sensory (lateral forearm)   MABC: Normal sensory (medial forearm)   MC: Normal motor (elbow flexion)   Axillary: Normal motor/sensory (deltoid)  Normal radial and ulnar pulses, warm and well perfused with capillary refill < 2 sec     Imaging:  X-ray bilateral elbows - 09/27/2024    Relevant imaging results were reviewed and interpreted by me and per my read:  Moderate degenerative changes about bilateral elbow joints.  Normal alignment.  No calcific tendinopathy.  No fractures.  No evidence of bursopathy.    This was discussed with the patient and / or family today.     Patient Instructions   Assessment:  Ju Sinclair is a  64 y.o. female with a chief complaint of Pain of the Left Elbow and Pain of the Right Elbow    Encounter Diagnoses   Name Primary?    Primary osteoarthritis of elbows, bilateral Yes    Bilateral elbow joint pain       Plan:  X-rays reviewed - mild-to-moderate degenerative changes about both elbow joints.  History and clinical exam suggestive of chronic underlying elbow pain due to osteoarthritis, as well as some irritation of the olecranon related to direct pressure.  Recommend conservative management.  We will trial anti-inflammatory.    Prescription sent for Celebrex 100 mg - take 1 capsule, once or twice per day for pain and discomfort.  Take with food to avoid stomach irritation.  Maintain adequate hydration.    Try to avoid direct pressure on the tips of your elbows.  Continue using moisturizing lotion over your elbows, to help with dry skin.    Follow-up:  2 months or sooner if there are any problems between now and then.    Thank you for choosing [facility] and Dr. Jay Starr for your orthopedic & sports medicine care. It is our goal to provide you with exceptional care that will help keep you healthy, active, and get you back in the game.    Please do not hesitate to reach out to us via email, phone, or MyChart with any questions, concerns, or feedback.    If you are experiencing pain/discomfort ,or have questions after 5pm and would like to be connected to the [facility] on-call team, please call this number and specify which Sports Medicine provider is treating you: [phone]       A copy of today's visit note has been sent to the referring provider.           Jay Starr MD  Primary Care Sports Medicine    Disclaimer: This note was prepared using a voice recognition system and is likely to have sound alike errors within the text.

## 2024-09-27 NOTE — PATIENT INSTRUCTIONS
Assessment:  Ju Sinclair is a 64 y.o. female with a chief complaint of Pain of the Left Elbow and Pain of the Right Elbow    Encounter Diagnoses   Name Primary?    Primary osteoarthritis of elbows, bilateral Yes    Bilateral elbow joint pain       Plan:  X-rays reviewed - mild-to-moderate degenerative changes about both elbow joints.  History and clinical exam suggestive of chronic underlying elbow pain due to osteoarthritis, as well as some irritation of the olecranon related to direct pressure.  Recommend conservative management.  We will trial anti-inflammatory.    Prescription sent for Celebrex 100 mg - take 1 capsule, once or twice per day for pain and discomfort.  Take with food to avoid stomach irritation.  Maintain adequate hydration.    Try to avoid direct pressure on the tips of your elbows.  Continue using moisturizing lotion over your elbows, to help with dry skin.    Follow-up:  2 months or sooner if there are any problems between now and then.    Thank you for choosing [facility] and Dr. Jay Starr for your orthopedic & sports medicine care. It is our goal to provide you with exceptional care that will help keep you healthy, active, and get you back in the game.    Please do not hesitate to reach out to us via email, phone, or MyChart with any questions, concerns, or feedback.    If you are experiencing pain/discomfort ,or have questions after 5pm and would like to be connected to the [facility] on-call team, please call this number and specify which Sports Medicine provider is treating you: [phone]

## 2024-09-30 ENCOUNTER — CLINICAL SUPPORT (OUTPATIENT)
Dept: REHABILITATION | Facility: HOSPITAL | Age: 64
End: 2024-09-30
Payer: MEDICARE

## 2024-09-30 DIAGNOSIS — R53.1 DECREASED STRENGTH: ICD-10-CM

## 2024-09-30 DIAGNOSIS — M25.671 DECREASED RANGE OF MOTION OF BOTH ANKLES: Primary | ICD-10-CM

## 2024-09-30 DIAGNOSIS — M25.672 DECREASED RANGE OF MOTION OF BOTH ANKLES: Primary | ICD-10-CM

## 2024-09-30 DIAGNOSIS — Z74.09 DECREASED FUNCTIONAL MOBILITY AND ENDURANCE: ICD-10-CM

## 2024-09-30 PROCEDURE — 97112 NEUROMUSCULAR REEDUCATION: CPT

## 2024-09-30 NOTE — PROGRESS NOTES
[facility]   Physical Therapy Treatment Note        Name: Ju Sinclair  [facility] Number: [id number]    Therapy Diagnosis:   Encounter Diagnoses   Name Primary?    Decreased range of motion of both ankles Yes    Decreased strength     Decreased functional mobility and endurance      Physician: Roselyn Sam DPM    Visit Date: 9/30/2024    Physician Orders: Physical Therapy Evaluation and Treat  Medical Diagnosis from Referral:   Diagnosis   M76.61 (ICD-10-CM) - Tendonitis, Achilles, right   M77.51 (ICD-10-CM) - Retrocalcaneal bursitis (back of heel), right   Evaluation Date: 9/23/2024  Authorization Period Expiration: 09/17/2025  Plan of Care Expiration: 12/16/ 2024  Progress Note Due: 10/23/2024  Visit # / Visits authorized: 2/10   FOTO: 1/3      Precautions: Standard and Diabetes; High Blood Pressure; Osteoporosis     Time In: 11:00 AM  Time Out: 12:00 PM  Total Billable Time: 38 minutes  (Billing reflects 1 on 1 treatment time spent with patient)    SUBJECTIVE     Patient reports: she felt better after less session and states she is not having as much pain today. Patient states the toe exercises are the hardest for her to perform.  She was compliant with home exercise program.  Response to previous treatment: no adverse effects  Functional change: none noted yet    Pain: 5/10  Location:   [x] Right   [] Left:  right heel/achilles > left     OBJECTIVE     Objective Measures updated at progress report unless specified.     TREATMENT       CPT Intervention Performed  9/30/2024  Duration / Intensity     TE  Nu-step x 6 minutes level 1 LOWER EXTREMITY only     Recumbent bike x 6 minutes level 1     Standing calf stretch x 2 minutes bilateral slant board               NMR  toe yoga X  x  3 x 10 big toes   3 x 10 little toes      marble pick ups x  3 minutes each bilateral (1 round each)      towel scrunches x  3 x 10 bilateral     BAPS Board level 1 X  x  1 x 20 CW bilateral   1 x  20 CCW bilateral      ankle 4 ways x  2 x 10 INVERSION, EVERSION, PLANTAR FLEXION bilaterally each      LONG ARC QUADs      TA                                                               MT  talocrural distractions        SOFT TISSUE MOBILIZATION to bilateral gastrocnemius x 6 minutes   PLAN   progress as tolerated         CPT Codes available for Billing:   (06) minutes of Manual therapy (MT) to improve pain and ROM.  (08) minutes of Therapeutic Exercise (TE) to develop strength, endurance, range of motion, and flexibility.  (46) minutes of Neuromuscular Re-Education (NMR)  to improve: Balance, Coordination, Kinesthetic, Sense, Proprioception, and Posture.  (00) minutes of Therapeutic Activities (TA) to improve functional performance.  Unattended Electrical Stimulation (ES) for muscle performance or pain modulation.  BFR: Blood flow restriction applied during exercise  NP or (-): Not Performed    PATIENT EDUCATION AND HOME EXERCISES     Home Exercises Provided and Patient Education Provided     Education provided:   PURPOSE: Patient educated on the impairments noted above and the effects of physical therapy intervention to improve overall condition and QOL.   EXERCISE: Patient was educated on all the above exercise prior/during/after for proper posture, positioning, and execution for safe performance with home exercise program.   STRENGTH: Patient educated on the importance of improved core and extremity strength in order to improve alignment of the spine and extremities with static positions and dynamic movement.     Written Home Exercises Provided: Patient instructed to cont prior HEP. Exercises were reviewed and Ju was able to demonstrate them prior to the end of the session.  Ju demonstrated good  understanding of the education provided. See EMR under Patient Instructions for exercises provided during therapy sessions      ASSESSMENT   Patient tolerated today's session well and was able to incorporate  generalized ankle strengthening with good tolerance. Patient still requires extra time for foot intrinsic exercises due to decreased sensation and motor control, but overall does well. Patient would continue to benefit from further strengthening and improved tolerance to activity,    Ju Is progressing well towards her goals.   Patient prognosis is Guarded.     Patient will continue to benefit from skilled outpatient physical therapy to address the deficits listed in the problem list box on initial evaluation, provide patient/family education and to maximize patient's level of independence in the home and community environment.     Patient's spiritual, cultural and educational needs considered and patient agreeable to plan of care and goals.     Anticipated barriers to physical therapy: co-morbidities, sedentary lifestyle, chronicity of condition, and adherence to treatment plan     Goals:   Reviewed: 9/30/2024      Short Term Goals:  6 weeks Status  Date Met   PAIN: Patient will report worst pain of 5/10 in order to progress toward max functional ability and improve quality of life. [x] Progressing  [] Met  [] Not Met     FUNCTION: Patient will demonstrate improved function as indicated by a functional intake score of more than or equal to 51 out of 100 on FOTO. [x] Progressing  [] Met  [] Not Met     MOBILITY: Patient will improve Range of Motion to 50% of stated goals, listed in objective measures above, in order to progress towards independence with functional activities.  [x] Progressing  [] Met  [] Not Met     STRENGTH: Patient will improve strength to 50% of stated goals, listed in objective measures above, in order to progress towards independence with functional activities.  [x] Progressing  [] Met  [] Not Met        Long Term Goals:  12 weeks Status Date Met   PAIN: Patient will report worst pain of 4/10 in order to progress toward max functional ability and improve quality of life [x] Progressing  []  Met  [] Not Met     FUNCTION: Patient will demonstrate improved function as indicated by a functional intake score of more than or equal to 60 out of 100 on FOTO. [x] Progressing  [] Met  [] Not Met     MOBILITY: Patient will improve Range of Motion to stated goals, listed in objective measures above, in order to return to maximal functional potential and improve quality of life. [x] Progressing  [] Met  [] Not Met     STRENGTH: Patient will improve strength to stated goals, listed in objective measures above, in order to improve functional independence and quality of life. [x] Progressing  [] Met  [] Not Met     GAIT: Patient will demonstrate normalized gait mechanics with minimal compensation in order to return to prior level of function. [x] Progressing  [] Met  [] Not Met     Patient will return to normal ADL's, IADL's, community involvement, recreational activities, and work-related activities with less than or equal to 4/10 pain and maximal function.  [x] Progressing  [] Met  [] Not Met          PLAN     Monitor response to today's treatment session and progress with Physical Therapy plan of care as indicated.    Mildred Cheung, PT

## 2024-10-02 ENCOUNTER — CLINICAL SUPPORT (OUTPATIENT)
Dept: REHABILITATION | Facility: HOSPITAL | Age: 64
End: 2024-10-02
Payer: MEDICARE

## 2024-10-02 DIAGNOSIS — Z74.09 DECREASED FUNCTIONAL MOBILITY AND ENDURANCE: ICD-10-CM

## 2024-10-02 DIAGNOSIS — M25.672 DECREASED RANGE OF MOTION OF BOTH ANKLES: Primary | ICD-10-CM

## 2024-10-02 DIAGNOSIS — M25.671 DECREASED RANGE OF MOTION OF BOTH ANKLES: Primary | ICD-10-CM

## 2024-10-02 DIAGNOSIS — R53.1 DECREASED STRENGTH: ICD-10-CM

## 2024-10-02 PROCEDURE — 97112 NEUROMUSCULAR REEDUCATION: CPT

## 2024-10-02 PROCEDURE — 97530 THERAPEUTIC ACTIVITIES: CPT

## 2024-10-02 NOTE — PROGRESS NOTES
[facility]   Physical Therapy Treatment Note        Name: Ju Sinclair  [facility] Number: [id number]    Therapy Diagnosis:   Encounter Diagnoses   Name Primary?    Decreased range of motion of both ankles Yes    Decreased strength     Decreased functional mobility and endurance      Physician: Roselyn Sam DPM    Visit Date: 10/2/2024    Physician Orders: Physical Therapy Evaluation and Treat  Medical Diagnosis from Referral:   Diagnosis   M76.61 (ICD-10-CM) - Tendonitis, Achilles, right   M77.51 (ICD-10-CM) - Retrocalcaneal bursitis (back of heel), right   Evaluation Date: 9/23/2024  Authorization Period Expiration: 09/17/2025  Plan of Care Expiration: 12/16/ 2024  Progress Note Due: 10/23/2024  Visit # / Visits authorized: 3/10   FOTO: 1/3      Precautions: Standard and Diabetes; High Blood Pressure; Osteoporosis     Time In: 11:00 AM  Time Out: 11:53 AM PM  Total Billable Time: 25 minutes  (Billing reflects 1 on 1 treatment time spent with patient)    SUBJECTIVE     Patient reports: her pain has not increased after last session and she is ready to be progressed today and do anything that will help continue decreasing her pain.  She was compliant with home exercise program.  Response to previous treatment: no adverse effects  Functional change: none noted yet    Pain: 5/10  Location:   [x] Right   [] Left:  right heel/achilles > left     OBJECTIVE     Objective Measures updated at progress report unless specified.     TREATMENT       CPT Intervention Performed  10/2/2024  Duration / Intensity     TE  Nu-step  6 minutes level 1 LOWER EXTREMITY only     Recumbent bike x 6 minutes level 1     Standing calf stretch x 2 minutes bilateral slant board               NMR  toe yoga X  x  3 x 10 big toes   3 x 10 little toes      marble pick ups   3 minutes each bilateral (1 round each)      towel scrunches x  3 x 10 bilateral     BAPS Board level 1 X  x  1 x 20 CW bilateral   1 x 20 CCW  bilateral      ankle 4 ways   2 x 10 INVERSION, EVERSION, PLANTAR FLEXION bilaterally each      LONG ARC QUADs      TA  tandem walking airex x  10 laps forward      single leg balance x 2 x 30 seconds each bilateral      standing calf raise x 3 x 10 bilateral                                        MT  talocrural distractions        SOFT TISSUE MOBILIZATION to bilateral gastrocnemius x 8 minutes (cupping today)   PLAN   progress as tolerated         CPT Codes available for Billing:   (09) minutes of Manual therapy (MT) to improve pain and ROM.  (08) minutes of Therapeutic Exercise (TE) to develop strength, endurance, range of motion, and flexibility.  (20) minutes of Neuromuscular Re-Education (NMR)  to improve: Balance, Coordination, Kinesthetic, Sense, Proprioception, and Posture.  (16) minutes of Therapeutic Activities (TA) to improve functional performance.  Unattended Electrical Stimulation (ES) for muscle performance or pain modulation.  BFR: Blood flow restriction applied during exercise  NP or (-): Not Performed    PATIENT EDUCATION AND HOME EXERCISES     Home Exercises Provided and Patient Education Provided     Education provided:   PURPOSE: Patient educated on the impairments noted above and the effects of physical therapy intervention to improve overall condition and QOL.   EXERCISE: Patient was educated on all the above exercise prior/during/after for proper posture, positioning, and execution for safe performance with home exercise program.   STRENGTH: Patient educated on the importance of improved core and extremity strength in order to improve alignment of the spine and extremities with static positions and dynamic movement.     Written Home Exercises Provided: Patient instructed to cont prior HEP. Exercises were reviewed and Ju was able to demonstrate them prior to the end of the session.  Ju demonstrated good  understanding of the education provided. See EMR under Patient Instructions for  exercises provided during therapy sessions      ASSESSMENT   Progressed patient with standing functional balance and strengthening activities. Patient tolerated progressions well without any reports of increased pain. Patient did struggle with single leg balance, but did progressively perform task better with time. Patient with good response to cupping today and ended with a slant bord stretch to facilitate lengthening of gastrocnemius.     Ju Is progressing well towards her goals.   Patient prognosis is Guarded.     Patient will continue to benefit from skilled outpatient physical therapy to address the deficits listed in the problem list box on initial evaluation, provide patient/family education and to maximize patient's level of independence in the home and community environment.     Patient's spiritual, cultural and educational needs considered and patient agreeable to plan of care and goals.     Anticipated barriers to physical therapy: co-morbidities, sedentary lifestyle, chronicity of condition, and adherence to treatment plan     Goals:   Reviewed: 10/2/2024      Short Term Goals:  6 weeks Status  Date Met   PAIN: Patient will report worst pain of 5/10 in order to progress toward max functional ability and improve quality of life. [x] Progressing  [] Met  [] Not Met     FUNCTION: Patient will demonstrate improved function as indicated by a functional intake score of more than or equal to 51 out of 100 on FOTO. [x] Progressing  [] Met  [] Not Met     MOBILITY: Patient will improve Range of Motion to 50% of stated goals, listed in objective measures above, in order to progress towards independence with functional activities.  [x] Progressing  [] Met  [] Not Met     STRENGTH: Patient will improve strength to 50% of stated goals, listed in objective measures above, in order to progress towards independence with functional activities.  [x] Progressing  [] Met  [] Not Met        Long Term Goals:  12 weeks  Status Date Met   PAIN: Patient will report worst pain of 4/10 in order to progress toward max functional ability and improve quality of life [x] Progressing  [] Met  [] Not Met     FUNCTION: Patient will demonstrate improved function as indicated by a functional intake score of more than or equal to 60 out of 100 on FOTO. [x] Progressing  [] Met  [] Not Met     MOBILITY: Patient will improve Range of Motion to stated goals, listed in objective measures above, in order to return to maximal functional potential and improve quality of life. [x] Progressing  [] Met  [] Not Met     STRENGTH: Patient will improve strength to stated goals, listed in objective measures above, in order to improve functional independence and quality of life. [x] Progressing  [] Met  [] Not Met     GAIT: Patient will demonstrate normalized gait mechanics with minimal compensation in order to return to prior level of function. [x] Progressing  [] Met  [] Not Met     Patient will return to normal ADL's, IADL's, community involvement, recreational activities, and work-related activities with less than or equal to 4/10 pain and maximal function.  [x] Progressing  [] Met  [] Not Met          PLAN     Monitor response to today's treatment session and progress with Physical Therapy plan of care as indicated.    Mildred Cheung, PT

## 2024-10-07 ENCOUNTER — CLINICAL SUPPORT (OUTPATIENT)
Dept: REHABILITATION | Facility: HOSPITAL | Age: 64
End: 2024-10-07
Payer: MEDICARE

## 2024-10-07 DIAGNOSIS — R53.1 DECREASED STRENGTH: ICD-10-CM

## 2024-10-07 DIAGNOSIS — M25.671 DECREASED RANGE OF MOTION OF BOTH ANKLES: Primary | ICD-10-CM

## 2024-10-07 DIAGNOSIS — Z74.09 DECREASED FUNCTIONAL MOBILITY AND ENDURANCE: ICD-10-CM

## 2024-10-07 DIAGNOSIS — M25.672 DECREASED RANGE OF MOTION OF BOTH ANKLES: Primary | ICD-10-CM

## 2024-10-07 PROCEDURE — 97530 THERAPEUTIC ACTIVITIES: CPT

## 2024-10-07 PROCEDURE — 97140 MANUAL THERAPY 1/> REGIONS: CPT

## 2024-10-07 NOTE — PROGRESS NOTES
[facility]   Physical Therapy Treatment Note        Name: Ju Sinclair  [facility] Number: [id number]    Therapy Diagnosis:   Encounter Diagnoses   Name Primary?    Decreased range of motion of both ankles Yes    Decreased strength     Decreased functional mobility and endurance      Physician: Roselyn Sam DPM    Visit Date: 10/7/2024    Physician Orders: Physical Therapy Evaluation and Treat  Medical Diagnosis from Referral:   Diagnosis   M76.61 (ICD-10-CM) - Tendonitis, Achilles, right   M77.51 (ICD-10-CM) - Retrocalcaneal bursitis (back of heel), right   Evaluation Date: 9/23/2024  Authorization Period Expiration: 09/17/2025  Plan of Care Expiration: 12/16/ 2024  Progress Note Due: 10/23/2024  Visit # / Visits authorized: 4/10   FOTO: 1/3      Precautions: Standard and Diabetes; High Blood Pressure; Osteoporosis     Time In: 11:00 AM  Time Out: 11:55 AM   Total Billable Time: 25 minutes  (Billing reflects 1 on 1 treatment time spent with patient)    SUBJECTIVE     Patient reports: still having 5/10 pain, however, patient states she is pleased with her progress thus far because her pain intensity has decreased greatly since her initial evaluation.  She was compliant with home exercise program.  Response to previous treatment: no adverse effects  Functional change: none noted yet    Pain: 5/10  Location:   [] Right   [] Left:  right heel/achilles > left     OBJECTIVE     Objective Measures updated at progress report unless specified.     TREATMENT       CPT Intervention Performed  10/7/2024  Duration / Intensity     TE  Nu-step  6 minutes level 1 LOWER EXTREMITY only     Recumbent bike x 6 minutes level 1     Standing calf stretch x 2 minutes bilateral slant board               NMR  toe yoga X  x  3 x 10 big toes   3 x 10 little toes      marble pick ups   3 minutes each bilateral (1 round each)      towel scrunches x  3 x 10 bilateral     BAPS Board level 2 X  x  1 x 20 CW  bilateral   1 x 20 CCW bilateral      ankle 4 ways   2 x 10 INVERSION, EVERSION, PLANTAR FLEXION bilaterally each      LONG ARC QUADs      TA  tandem walking airex x  10 laps forward      single leg balance x 3 x 30 seconds each bilateral      standing calf raise x 3 x 10 bilateral      standing tibialis anterior raise next        leg press next                        MT  talocrural distractions        SOFT TISSUE MOBILIZATION to bilateral gastrocnemius x 8 minutes    PLAN   progress as tolerated         CPT Codes available for Billing:   (09) minutes of Manual therapy (MT) to improve pain and ROM.  (08) minutes of Therapeutic Exercise (TE) to develop strength, endurance, range of motion, and flexibility.  (20) minutes of Neuromuscular Re-Education (NMR)  to improve: Balance, Coordination, Kinesthetic, Sense, Proprioception, and Posture.  (16) minutes of Therapeutic Activities (TA) to improve functional performance.  Unattended Electrical Stimulation (ES) for muscle performance or pain modulation.  BFR: Blood flow restriction applied during exercise  NP or (-): Not Performed    PATIENT EDUCATION AND HOME EXERCISES     Home Exercises Provided and Patient Education Provided     Education provided:   PURPOSE: Patient educated on the impairments noted above and the effects of physical therapy intervention to improve overall condition and QOL.   EXERCISE: Patient was educated on all the above exercise prior/during/after for proper posture, positioning, and execution for safe performance with home exercise program.   STRENGTH: Patient educated on the importance of improved core and extremity strength in order to improve alignment of the spine and extremities with static positions and dynamic movement.     Written Home Exercises Provided: Patient instructed to cont prior HEP. Exercises were reviewed and Ju was able to demonstrate them prior to the end of the session.  Ju demonstrated good  understanding of the  education provided. See EMR under Patient Instructions for exercises provided during therapy sessions      ASSESSMENT   Increased level on BAPS board to continue to owrk on ankle stability to which patient tolerated well. Discussed with patient importance of compliance with compression socks to which patient verbalized understanding. Also educated patient on propping ankles above heart level for continued support of swelling. Plan to progress functional strengthening next visit as tolerable.    Ju Is progressing well towards her goals.   Patient prognosis is Guarded.     Patient will continue to benefit from skilled outpatient physical therapy to address the deficits listed in the problem list box on initial evaluation, provide patient/family education and to maximize patient's level of independence in the home and community environment.     Patient's spiritual, cultural and educational needs considered and patient agreeable to plan of care and goals.     Anticipated barriers to physical therapy: co-morbidities, sedentary lifestyle, chronicity of condition, and adherence to treatment plan     Goals:   Reviewed: 10/7/2024      Short Term Goals:  6 weeks Status  Date Met   PAIN: Patient will report worst pain of 5/10 in order to progress toward max functional ability and improve quality of life. [x] Progressing  [] Met  [] Not Met     FUNCTION: Patient will demonstrate improved function as indicated by a functional intake score of more than or equal to 51 out of 100 on FOTO. [x] Progressing  [] Met  [] Not Met     MOBILITY: Patient will improve Range of Motion to 50% of stated goals, listed in objective measures above, in order to progress towards independence with functional activities.  [x] Progressing  [] Met  [] Not Met     STRENGTH: Patient will improve strength to 50% of stated goals, listed in objective measures above, in order to progress towards independence with functional activities.  [x] Progressing  []  Met  [] Not Met        Long Term Goals:  12 weeks Status Date Met   PAIN: Patient will report worst pain of 4/10 in order to progress toward max functional ability and improve quality of life [x] Progressing  [] Met  [] Not Met     FUNCTION: Patient will demonstrate improved function as indicated by a functional intake score of more than or equal to 60 out of 100 on FOTO. [x] Progressing  [] Met  [] Not Met     MOBILITY: Patient will improve Range of Motion to stated goals, listed in objective measures above, in order to return to maximal functional potential and improve quality of life. [x] Progressing  [] Met  [] Not Met     STRENGTH: Patient will improve strength to stated goals, listed in objective measures above, in order to improve functional independence and quality of life. [x] Progressing  [] Met  [] Not Met     GAIT: Patient will demonstrate normalized gait mechanics with minimal compensation in order to return to prior level of function. [x] Progressing  [] Met  [] Not Met     Patient will return to normal ADL's, IADL's, community involvement, recreational activities, and work-related activities with less than or equal to 4/10 pain and maximal function.  [x] Progressing  [] Met  [] Not Met          PLAN     Monitor response to today's treatment session and progress with Physical Therapy plan of care as indicated.    Mildred Cheung, PT

## 2024-10-09 ENCOUNTER — CLINICAL SUPPORT (OUTPATIENT)
Dept: REHABILITATION | Facility: HOSPITAL | Age: 64
End: 2024-10-09
Payer: MEDICARE

## 2024-10-09 DIAGNOSIS — M25.671 DECREASED RANGE OF MOTION OF BOTH ANKLES: Primary | ICD-10-CM

## 2024-10-09 DIAGNOSIS — Z74.09 DECREASED FUNCTIONAL MOBILITY AND ENDURANCE: ICD-10-CM

## 2024-10-09 DIAGNOSIS — M25.672 DECREASED RANGE OF MOTION OF BOTH ANKLES: Primary | ICD-10-CM

## 2024-10-09 DIAGNOSIS — R53.1 DECREASED STRENGTH: ICD-10-CM

## 2024-10-09 PROCEDURE — 97530 THERAPEUTIC ACTIVITIES: CPT | Mod: KX

## 2024-10-09 PROCEDURE — 97140 MANUAL THERAPY 1/> REGIONS: CPT | Mod: KX

## 2024-10-09 NOTE — PROGRESS NOTES
[facility]   Physical Therapy Treatment Note        Name: Ju Sinclair  [facility] Number: [id number]    Therapy Diagnosis:   Encounter Diagnoses   Name Primary?    Decreased range of motion of both ankles Yes    Decreased strength     Decreased functional mobility and endurance      Physician: Roselyn Sam DPM    Visit Date: 10/9/2024    Physician Orders: Physical Therapy Evaluation and Treat  Medical Diagnosis from Referral:   Diagnosis   M76.61 (ICD-10-CM) - Tendonitis, Achilles, right   M77.51 (ICD-10-CM) - Retrocalcaneal bursitis (back of heel), right   Evaluation Date: 9/23/2024  Authorization Period Expiration: 09/17/2025  Plan of Care Expiration: 12/16/ 2024  Progress Note Due: 10/23/2024  Visit # / Visits authorized: 5/10   FOTO: 1/3      Precautions: Standard and Diabetes; High Blood Pressure; Osteoporosis     Time In: 11:00 AM  Time Out: 11:55 AM   Total Billable Time: 25 minutes  (Billing reflects 1 on 1 treatment time spent with patient)    SUBJECTIVE     Patient reports: she was a little sore from the manual last visit, however, patient states she is still doing better.  She was compliant with home exercise program.  Response to previous treatment: no adverse effects  Functional change: none noted yet    Pain: 5/10  Location:  right heel/achilles > left     OBJECTIVE     Objective Measures updated at progress report unless specified.     TREATMENT       CPT Intervention Performed  10/9/2024  Duration / Intensity     TE  Nu-step  6 minutes level 1 LOWER EXTREMITY only     Recumbent bike x 6 minutes level 1     Standing calf stretch  2 minutes bilateral slant board               NMR  toe yoga X  x  3 x 10 big toes   3 x 10 little toes      marble pick ups   3 minutes each bilateral (1 round each)      towel scrunches x  3 x 10 bilateral     BAPS Board level 2 X  x  1 x 20 CW bilateral   1 x 20 CCW bilateral      ankle 4 ways   2 x 10 INVERSION, EVERSION, PLANTAR  FLEXION bilaterally each      LONG ARC QUADs      TA  tandem walking airex x  10 laps forward      single leg balance x 3 x 30 seconds each bilateral      standing calf raise  3 x 10 bilateral      standing tibialis anterior raise         sit to stands x 2 x 10 with focus on arch support for bilateral feet      eccentric calf raise x 3 x 10 on 4 inch box             MT  talocrural distractions        SOFT TISSUE MOBILIZATION to bilateral gastrocnemius x 8 minutes    PLAN   progress as tolerated         CPT Codes available for Billing:   (08) minutes of Manual therapy (MT) to improve pain and ROM.  (06) minutes of Therapeutic Exercise (TE) to develop strength, endurance, range of motion, and flexibility.  (14) minutes of Neuromuscular Re-Education (NMR)  to improve: Balance, Coordination, Kinesthetic, Sense, Proprioception, and Posture.  (27) minutes of Therapeutic Activities (TA) to improve functional performance.  Unattended Electrical Stimulation (ES) for muscle performance or pain modulation.  BFR: Blood flow restriction applied during exercise  NP or (-): Not Performed    PATIENT EDUCATION AND HOME EXERCISES     Home Exercises Provided and Patient Education Provided     Education provided:   PURPOSE: Patient educated on the impairments noted above and the effects of physical therapy intervention to improve overall condition and QOL.   EXERCISE: Patient was educated on all the above exercise prior/during/after for proper posture, positioning, and execution for safe performance with home exercise program.   STRENGTH: Patient educated on the importance of improved core and extremity strength in order to improve alignment of the spine and extremities with static positions and dynamic movement.     Written Home Exercises Provided: Patient instructed to cont prior HEP. Exercises were reviewed and Ju was able to demonstrate them prior to the end of the session.  Ju demonstrated good  understanding of the  education provided. See EMR under Patient Instructions for exercises provided during therapy sessions      ASSESSMENT   Progressed patient today with functional strengthening with sit to stands as well as eccentric calf raises to increase load placed on achilles tendon. Patient only with reports of musculature fatigue in calf musculature and no pain reported at heel. Patient with good response to all treatments and left without any complaints of increased pain.    Ju Is progressing well towards her goals.   Patient prognosis is Guarded.     Patient will continue to benefit from skilled outpatient physical therapy to address the deficits listed in the problem list box on initial evaluation, provide patient/family education and to maximize patient's level of independence in the home and community environment.     Patient's spiritual, cultural and educational needs considered and patient agreeable to plan of care and goals.     Anticipated barriers to physical therapy: co-morbidities, sedentary lifestyle, chronicity of condition, and adherence to treatment plan     Goals:   Reviewed: 10/9/2024      Short Term Goals:  6 weeks Status  Date Met   PAIN: Patient will report worst pain of 5/10 in order to progress toward max functional ability and improve quality of life. [x] Progressing  [] Met  [] Not Met     FUNCTION: Patient will demonstrate improved function as indicated by a functional intake score of more than or equal to 51 out of 100 on FOTO. [x] Progressing  [] Met  [] Not Met     MOBILITY: Patient will improve Range of Motion to 50% of stated goals, listed in objective measures above, in order to progress towards independence with functional activities.  [x] Progressing  [] Met  [] Not Met     STRENGTH: Patient will improve strength to 50% of stated goals, listed in objective measures above, in order to progress towards independence with functional activities.  [x] Progressing  [] Met  [] Not Met        Long  Term Goals:  12 weeks Status Date Met   PAIN: Patient will report worst pain of 4/10 in order to progress toward max functional ability and improve quality of life [x] Progressing  [] Met  [] Not Met     FUNCTION: Patient will demonstrate improved function as indicated by a functional intake score of more than or equal to 60 out of 100 on FOTO. [x] Progressing  [] Met  [] Not Met     MOBILITY: Patient will improve Range of Motion to stated goals, listed in objective measures above, in order to return to maximal functional potential and improve quality of life. [x] Progressing  [] Met  [] Not Met     STRENGTH: Patient will improve strength to stated goals, listed in objective measures above, in order to improve functional independence and quality of life. [x] Progressing  [] Met  [] Not Met     GAIT: Patient will demonstrate normalized gait mechanics with minimal compensation in order to return to prior level of function. [x] Progressing  [] Met  [] Not Met     Patient will return to normal ADL's, IADL's, community involvement, recreational activities, and work-related activities with less than or equal to 4/10 pain and maximal function.  [x] Progressing  [] Met  [] Not Met          PLAN     Monitor response to today's treatment session and progress with Physical Therapy plan of care as indicated.    Mildred Cheung, PT

## 2024-10-14 ENCOUNTER — CLINICAL SUPPORT (OUTPATIENT)
Dept: REHABILITATION | Facility: HOSPITAL | Age: 64
End: 2024-10-14
Payer: MEDICARE

## 2024-10-14 DIAGNOSIS — R53.1 DECREASED STRENGTH: ICD-10-CM

## 2024-10-14 DIAGNOSIS — Z74.09 DECREASED FUNCTIONAL MOBILITY AND ENDURANCE: ICD-10-CM

## 2024-10-14 DIAGNOSIS — M25.672 DECREASED RANGE OF MOTION OF BOTH ANKLES: Primary | ICD-10-CM

## 2024-10-14 DIAGNOSIS — M25.671 DECREASED RANGE OF MOTION OF BOTH ANKLES: Primary | ICD-10-CM

## 2024-10-14 PROCEDURE — 97140 MANUAL THERAPY 1/> REGIONS: CPT | Mod: KX

## 2024-10-14 PROCEDURE — 97112 NEUROMUSCULAR REEDUCATION: CPT | Mod: KX

## 2024-10-14 PROCEDURE — 97530 THERAPEUTIC ACTIVITIES: CPT | Mod: KX

## 2024-10-14 NOTE — PROGRESS NOTES
[facility]   Physical Therapy Treatment Note        Name: Ju Sinclair  [facility] Number: [id number]    Therapy Diagnosis:   Encounter Diagnoses   Name Primary?    Decreased range of motion of both ankles Yes    Decreased strength     Decreased functional mobility and endurance      Physician: Roselyn Sam DPM    Visit Date: 10/14/2024    Physician Orders: Physical Therapy Evaluation and Treat  Medical Diagnosis from Referral:   Diagnosis   M76.61 (ICD-10-CM) - Tendonitis, Achilles, right   M77.51 (ICD-10-CM) - Retrocalcaneal bursitis (back of heel), right   Evaluation Date: 9/23/2024  Authorization Period Expiration: 09/17/2025  Plan of Care Expiration: 12/16/ 2024  Progress Note Due: 10/23/2024  Visit # / Visits authorized: 6/10   FOTO: 1/3      Precautions: Standard and Diabetes; High Blood Pressure; Osteoporosis     Time In: 12:30 PM  Time Out: 1:25 PM   Total Billable Time: 55 minutes  (Billing reflects 1 on 1 treatment time spent with patient)    SUBJECTIVE     Patient reports: she had more heel pain yesterday but also reports noticing more swelling. Patient states she is still 100% complaint with her compression stockings.  She was compliant with home exercise program.  Response to previous treatment: no adverse effects  Functional change: none noted yet    Pain: 5/10  Location:  right heel/achilles > left     OBJECTIVE     Objective Measures updated at progress report unless specified.     TREATMENT       CPT Intervention Performed  10/14/2024  Duration / Intensity     TE  Nu-step  6 minutes level 1 LOWER EXTREMITY only     Recumbent bike x 6 minutes level 1     Standing calf stretch x 2 minutes bilateral slant board               NMR  toe yoga   3 x 10 big toes   3 x 10 little toes      marble pick ups   3 minutes each bilateral (1 round each)      towel scrunches x  3 x 10 bilateral     BAPS Board level 2 X  x  1 x 20 CW bilateral   1 x 20 CCW bilateral      ankle 4  ways   2 x 10 INVERSION, EVERSION, PLANTAR FLEXION bilaterally each      LONG ARC QUADs      TA  tandem walking airex x  10 laps forward      single leg balance x 3 x 30 seconds each bilateral on airex      standing calf raise  3 x 10 bilateral      standing tibialis anterior raise         sit to stands x 2 x 10 with focus on arch support for bilateral feet      eccentric calf raise x 3 x 10 on 4 inch box    Shuttle Squat with focus x 3 minutes 4 bands with focus on ankle DORSIFLEXION              MT  talocrural distractions        SOFT TISSUE MOBILIZATION to bilateral gastrocnemius x 8 minutes    PLAN   progress as tolerated         CPT Codes available for Billing:   (08) minutes of Manual therapy (MT) to improve pain and ROM.  (08) minutes of Therapeutic Exercise (TE) to develop strength, endurance, range of motion, and flexibility.  (12) minutes of Neuromuscular Re-Education (NMR)  to improve: Balance, Coordination, Kinesthetic, Sense, Proprioception, and Posture.  (27) minutes of Therapeutic Activities (TA) to improve functional performance.  Unattended Electrical Stimulation (ES) for muscle performance or pain modulation.  BFR: Blood flow restriction applied during exercise  NP or (-): Not Performed    PATIENT EDUCATION AND HOME EXERCISES     Home Exercises Provided and Patient Education Provided     Education provided:   PURPOSE: Patient educated on the impairments noted above and the effects of physical therapy intervention to improve overall condition and QOL.   EXERCISE: Patient was educated on all the above exercise prior/during/after for proper posture, positioning, and execution for safe performance with home exercise program.   STRENGTH: Patient educated on the importance of improved core and extremity strength in order to improve alignment of the spine and extremities with static positions and dynamic movement.     Written Home Exercises Provided: Patient instructed to cont prior HEP. Exercises were  reviewed and Ju was able to demonstrate them prior to the end of the session.  Ju demonstrated good  understanding of the education provided. See EMR under Patient Instructions for exercises provided during therapy sessions      ASSESSMENT   Progressed patient with further eccentric stretching into ankle DORSIFLEXION thereby stretching out achilles tendons. Patient tolerated these progressions well but did have musculare fatigue by end of session. Patient educated to discuss further options with doctor if she continues to have flare-ups of pain in conjunction with ankle swelling. Patient verbalized understanding.     uJ Is progressing well towards her goals.   Patient prognosis is Guarded.     Patient will continue to benefit from skilled outpatient physical therapy to address the deficits listed in the problem list box on initial evaluation, provide patient/family education and to maximize patient's level of independence in the home and community environment.     Patient's spiritual, cultural and educational needs considered and patient agreeable to plan of care and goals.     Anticipated barriers to physical therapy: co-morbidities, sedentary lifestyle, chronicity of condition, and adherence to treatment plan     Goals:   Reviewed: 10/14/2024      Short Term Goals:  6 weeks Status  Date Met   PAIN: Patient will report worst pain of 5/10 in order to progress toward max functional ability and improve quality of life. [x] Progressing  [] Met  [] Not Met     FUNCTION: Patient will demonstrate improved function as indicated by a functional intake score of more than or equal to 51 out of 100 on FOTO. [x] Progressing  [] Met  [] Not Met     MOBILITY: Patient will improve Range of Motion to 50% of stated goals, listed in objective measures above, in order to progress towards independence with functional activities.  [x] Progressing  [] Met  [] Not Met     STRENGTH: Patient will improve strength to 50% of stated  goals, listed in objective measures above, in order to progress towards independence with functional activities.  [x] Progressing  [] Met  [] Not Met        Long Term Goals:  12 weeks Status Date Met   PAIN: Patient will report worst pain of 4/10 in order to progress toward max functional ability and improve quality of life [x] Progressing  [] Met  [] Not Met     FUNCTION: Patient will demonstrate improved function as indicated by a functional intake score of more than or equal to 60 out of 100 on FOTO. [x] Progressing  [] Met  [] Not Met     MOBILITY: Patient will improve Range of Motion to stated goals, listed in objective measures above, in order to return to maximal functional potential and improve quality of life. [x] Progressing  [] Met  [] Not Met     STRENGTH: Patient will improve strength to stated goals, listed in objective measures above, in order to improve functional independence and quality of life. [x] Progressing  [] Met  [] Not Met     GAIT: Patient will demonstrate normalized gait mechanics with minimal compensation in order to return to prior level of function. [x] Progressing  [] Met  [] Not Met     Patient will return to normal ADL's, IADL's, community involvement, recreational activities, and work-related activities with less than or equal to 4/10 pain and maximal function.  [x] Progressing  [] Met  [] Not Met          PLAN     Monitor response to today's treatment session and progress with Physical Therapy plan of care as indicated.    Mildred Cheung, PT

## 2024-10-15 ENCOUNTER — HOSPITAL ENCOUNTER (OUTPATIENT)
Dept: RADIOLOGY | Facility: HOSPITAL | Age: 64
Discharge: HOME | End: 2024-10-15
Attending: PODIATRIST
Payer: MEDICARE

## 2024-10-15 ENCOUNTER — OFFICE VISIT (OUTPATIENT)
Dept: PODIATRY | Facility: CLINIC | Age: 64
End: 2024-10-15
Payer: MEDICARE

## 2024-10-15 VITALS — WEIGHT: 233.69 LBS | BODY MASS INDEX: 37.56 KG/M2 | HEIGHT: 66 IN

## 2024-10-15 DIAGNOSIS — M76.61 TENDONITIS, ACHILLES, RIGHT: Primary | ICD-10-CM

## 2024-10-15 DIAGNOSIS — I89.0 LYMPHEDEMA: ICD-10-CM

## 2024-10-15 DIAGNOSIS — E11.42 DM TYPE 2 WITH DIABETIC PERIPHERAL NEUROPATHY: ICD-10-CM

## 2024-10-15 DIAGNOSIS — M72.2 PLANTAR FASCIITIS OF RIGHT FOOT: ICD-10-CM

## 2024-10-15 DIAGNOSIS — M77.51 RETROCALCANEAL BURSITIS (BACK OF HEEL), RIGHT: ICD-10-CM

## 2024-10-15 PROCEDURE — 3008F BODY MASS INDEX DOCD: CPT | Mod: CPTII,S$GLB,, | Performed by: PODIATRIST

## 2024-10-15 PROCEDURE — 3288F FALL RISK ASSESSMENT DOCD: CPT | Mod: CPTII,S$GLB,, | Performed by: PODIATRIST

## 2024-10-15 PROCEDURE — 93971 EXTREMITY STUDY: CPT | Mod: TC,PO,RT

## 2024-10-15 PROCEDURE — 1125F AMNT PAIN NOTED PAIN PRSNT: CPT | Mod: CPTII,S$GLB,, | Performed by: PODIATRIST

## 2024-10-15 PROCEDURE — 1101F PT FALLS ASSESS-DOCD LE1/YR: CPT | Mod: CPTII,S$GLB,, | Performed by: PODIATRIST

## 2024-10-15 PROCEDURE — 1159F MED LIST DOCD IN RCRD: CPT | Mod: CPTII,S$GLB,, | Performed by: PODIATRIST

## 2024-10-15 PROCEDURE — 99999 PR PBB SHADOW E&M-EST. PATIENT-LVL III: CPT | Mod: PBBFAC,,, | Performed by: PODIATRIST

## 2024-10-15 PROCEDURE — 99214 OFFICE O/P EST MOD 30 MIN: CPT | Mod: S$GLB,,, | Performed by: PODIATRIST

## 2024-10-15 PROCEDURE — 93971 EXTREMITY STUDY: CPT | Mod: 26,RT,, | Performed by: RADIOLOGY

## 2024-10-15 PROCEDURE — 4010F ACE/ARB THERAPY RXD/TAKEN: CPT | Mod: CPTII,S$GLB,, | Performed by: PODIATRIST

## 2024-10-15 RX ORDER — DICLOFENAC SODIUM 75 MG/1
75 TABLET, DELAYED RELEASE ORAL 2 TIMES DAILY
Qty: 20 TABLET | Refills: 0 | Status: SHIPPED | OUTPATIENT
Start: 2024-10-15 | End: 2024-10-25

## 2024-10-15 RX ORDER — CARVEDILOL 25 MG/1
TABLET ORAL
COMMUNITY
Start: 2024-10-14

## 2024-10-15 RX ORDER — MECLIZINE HYDROCHLORIDE 25 MG/1
25 TABLET ORAL
COMMUNITY

## 2024-10-15 NOTE — PROGRESS NOTES
Podiatry Department    Patient ID: Ju Sinclair is a 64 y.o. female.    Chief Complaint: Tendonitis (4 week f/u for 2 month achilles tendonitis, right, f/u, pt states she is still having pain , pt rates pain 10/10, pt has swelling still , pt is diabetic)    History of Present Illness    CHIEF COMPLAINT:  Patient presents today for follow up on chronic lower extremity swelling and foot pain.    LOWER EXTREMITY SWELLING:  She reports persistent lower extremity swelling despite daily use of compression stockings from morning until bedtime, denying significant improvement. A previous ultrasound was performed in 2021 for the swelling, but she has not had a vascular surgeon consultation since then. The swelling is reported to be chronic and not improving.    FOOT PAIN AND PLANTAR FASCIITIS:  She reports severe right foot heel pain and recurrence of plantar fasciitis. Pain is experienced in the posterior calf and along the medial aspect of the heel. Previous treatments include wearing a boot, taking a steroid Dosepak, and a plantar fasciitis injection in the past. She is nearing completion of physical therapy sessions at the [place], with approximately one week remaining. She notes experiencing temporary pain relief for about a day following each therapy session, but the pain returns shortly after.    MEDICATIONS AND ALLERGIES:  She uses diclofenac cream for pain management and tolerates ibuprofen without problems. She reports an allergy to Mobic (meloxicam), which reportedly caused palpitations.      ROS:  Cardiovascular: +palpitations            Physical Exam    MSK: Foot/Ankle - Right: Pain upon palpation to medial tubercle of right calcaneus. Pain at insertion point of Achilles posterior aspect on right foot. Tenderness in the posterior calf.  Extremities: Nonpitting edema bilateral lower extremities, right foot worse than left.           Diagnoses:  1. Tendonitis, Achilles, right    2. Retrocalcaneal bursitis  (back of heel), right    3. Lymphedema  -     US Lower Extremity Veins Right; Future; Expected date: 10/15/2024    4. Plantar fasciitis of right foot    5. DM type 2 with diabetic peripheral neuropathy  -     DIABETIC SHOES FOR HOME USE    Other orders  -     diclofenac (VOLTAREN) 75 MG EC tablet; Take 1 tablet (75 mg total) by mouth 2 (two) times daily. for 10 days  Dispense: 20 tablet; Refill: 0        Assessment & Plan    - Considered vascular evaluation due to chronic lower extremity swelling persisting since before 2021  - Assessed need for new ultrasound of lower extremities as previous study from 2021 is outdated  - Evaluated treatment options for heel pain:  - - Steroid injection for plantar fasciitis  - - Oral medication (diclofenac)  - - Potential cast application for right foot, pending vascular clearance  - Noted patient has completed physical therapy with limited long-term benefit  - Considered surgical intervention as a potential next step for persistent heel pain    HEEL PAIN:  - Discussed the purpose of casting: to reduce weight-bearing and decrease inflammation around the heel.  - Started diclofenac (oral) for heel pain.    CHRONIC LOWER EXTREMITY SWELLING:  - Patient to continue using compression stockings daily from morning until bedtime.  - Ultrasound of lower extremities ordered.  - Referred to vascular surgery for evaluation of chronic lower extremity swelling.               Future Appointments   Date Time Provider Department Center   10/21/2024 10:00 AM Mildred Cheung PT [facility] [place]   10/29/2024 10:00 AM Mildred Cheung PT [facility] [place]   11/6/2024 10:00 AM Mildred Cheung PT [facility] [place]   11/12/2024 10:45 AM Roselyn Sam DPM [facility] [place]   11/22/2024  3:00 PM Jay Starr MD [facility] [place]        This note was generated with the assistance of ambient listening technology. Verbal consent was obtained by the patient and  accompanying visitor(s) for the recording of patient appointment to facilitate this note. I attest to having reviewed and edited the generated note for accuracy, though some syntax or spelling errors may persist. Please contact the author of this note for any clarification.      Report Electronically Signed By:     Roselyn Sam DPM   Podiatry  [facility]  10/17/2024

## 2024-10-16 ENCOUNTER — CLINICAL SUPPORT (OUTPATIENT)
Dept: REHABILITATION | Facility: HOSPITAL | Age: 64
End: 2024-10-16
Payer: MEDICARE

## 2024-10-16 DIAGNOSIS — M25.672 DECREASED RANGE OF MOTION OF BOTH ANKLES: Primary | ICD-10-CM

## 2024-10-16 DIAGNOSIS — Z74.09 DECREASED FUNCTIONAL MOBILITY AND ENDURANCE: ICD-10-CM

## 2024-10-16 DIAGNOSIS — R53.1 DECREASED STRENGTH: ICD-10-CM

## 2024-10-16 DIAGNOSIS — M25.671 DECREASED RANGE OF MOTION OF BOTH ANKLES: Primary | ICD-10-CM

## 2024-10-16 PROCEDURE — 97140 MANUAL THERAPY 1/> REGIONS: CPT | Mod: KX

## 2024-10-16 PROCEDURE — 97530 THERAPEUTIC ACTIVITIES: CPT | Mod: KX

## 2024-10-16 NOTE — PROGRESS NOTES
[facility]   Physical Therapy Treatment Note        Name: Ju Sinclair  [facility] Number: [id number]    Therapy Diagnosis:   Encounter Diagnoses   Name Primary?    Decreased range of motion of both ankles Yes    Decreased strength     Decreased functional mobility and endurance      Physician: Roselyn Sam DPM    Visit Date: 10/16/2024    Physician Orders: Physical Therapy Evaluation and Treat  Medical Diagnosis from Referral:   Diagnosis   M76.61 (ICD-10-CM) - Tendonitis, Achilles, right   M77.51 (ICD-10-CM) - Retrocalcaneal bursitis (back of heel), right   Evaluation Date: 9/23/2024  Authorization Period Expiration: 09/17/2025  Plan of Care Expiration: 12/16/ 2024  Progress Note Due: 10/23/2024  Visit # / Visits authorized: 7/10   FOTO: 1/3      Precautions: Standard and Diabetes; High Blood Pressure; Osteoporosis     Time In: 11:00 AM  Time Out: 11:45 AM   Total Billable Time: 23 minutes  (Billing reflects 1 on 1 treatment time spent with patient)    SUBJECTIVE     Patient reports: she went to the doctor who suggested her having injections, however, patient states she is not ready to do this just yet and would like to continue with her therapy. Patient states she did get some tighter compression socks and wants to see if this would help with some of her swelling.  She was compliant with home exercise program.  Response to previous treatment: no adverse effects  Functional change: none noted yet    Pain: 5/10  Location:  right heel/achilles > left     OBJECTIVE     Objective Measures updated at progress report unless specified.     TREATMENT       CPT Intervention Performed  10/16/2024  Duration / Intensity     TE  Nu-step  6 minutes level 1 LOWER EXTREMITY only     Recumbent bike x 6 minutes level 1     Standing calf stretch  2 minutes bilateral slant board               NMR  toe yoga   3 x 10 big toes   3 x 10 little toes      marble pick ups   3 minutes each bilateral (1  round each)      towel scrunches   3 x 10 bilateral     BAPS Board level 2 X  x  1 x 20 CW bilateral   1 x 20 CCW bilateral      ankle 4 ways   2 x 10 INVERSION, EVERSION, PLANTAR FLEXION bilaterally each      LONG ARC QUADs      TA  tandem walking airex x  10 laps forward      single leg balance x 3 x 30 seconds each bilateral on airex      standing calf raise  3 x 10 bilateral      standing tibialis anterior raise         sit to stands x 2 x 10 with focus on arch support for bilateral feet      eccentric calf raise x 3 x 10 on 4 inch box    Shuttle Squat with focus  3 minutes 4 bands with focus on ankle DORSIFLEXION              MT  talocrural distractions        SOFT TISSUE MOBILIZATION to bilateral gastrocnemius x 8 minutes    PLAN   progress as tolerated         CPT Codes available for Billing:   (08) minutes of Manual therapy (MT) to improve pain and ROM.  (06) minutes of Therapeutic Exercise (TE) to develop strength, endurance, range of motion, and flexibility.  (04) minutes of Neuromuscular Re-Education (NMR)  to improve: Balance, Coordination, Kinesthetic, Sense, Proprioception, and Posture.  (27) minutes of Therapeutic Activities (TA) to improve functional performance.  Unattended Electrical Stimulation (ES) for muscle performance or pain modulation.  BFR: Blood flow restriction applied during exercise  NP or (-): Not Performed    PATIENT EDUCATION AND HOME EXERCISES     Home Exercises Provided and Patient Education Provided     Education provided:   PURPOSE: Patient educated on the impairments noted above and the effects of physical therapy intervention to improve overall condition and QOL.   EXERCISE: Patient was educated on all the above exercise prior/during/after for proper posture, positioning, and execution for safe performance with home exercise program.   STRENGTH: Patient educated on the importance of improved core and extremity strength in order to improve alignment of the spine and extremities  with static positions and dynamic movement.     Written Home Exercises Provided: Patient instructed to cont prior HEP. Exercises were reviewed and Ju was able to demonstrate them prior to the end of the session.  Ju demonstrated good  understanding of the education provided. See EMR under Patient Instructions for exercises provided during therapy sessions      ASSESSMENT   Patient tolerated session fairly well. Discussed with patient to consider suggested medial options for pain in continuation with therapy. Patient verbalized understanding. Plan to progress patient to functional strengthening next visit.    Ju Is progressing well towards her goals.   Patient prognosis is Guarded.     Patient will continue to benefit from skilled outpatient physical therapy to address the deficits listed in the problem list box on initial evaluation, provide patient/family education and to maximize patient's level of independence in the home and community environment.     Patient's spiritual, cultural and educational needs considered and patient agreeable to plan of care and goals.     Anticipated barriers to physical therapy: co-morbidities, sedentary lifestyle, chronicity of condition, and adherence to treatment plan     Goals:   Reviewed: 10/16/2024      Short Term Goals:  6 weeks Status  Date Met   PAIN: Patient will report worst pain of 5/10 in order to progress toward max functional ability and improve quality of life. [x] Progressing  [] Met  [] Not Met     FUNCTION: Patient will demonstrate improved function as indicated by a functional intake score of more than or equal to 51 out of 100 on FOTO. [x] Progressing  [] Met  [] Not Met     MOBILITY: Patient will improve Range of Motion to 50% of stated goals, listed in objective measures above, in order to progress towards independence with functional activities.  [x] Progressing  [] Met  [] Not Met     STRENGTH: Patient will improve strength to 50% of stated goals,  listed in objective measures above, in order to progress towards independence with functional activities.  [x] Progressing  [] Met  [] Not Met        Long Term Goals:  12 weeks Status Date Met   PAIN: Patient will report worst pain of 4/10 in order to progress toward max functional ability and improve quality of life [x] Progressing  [] Met  [] Not Met     FUNCTION: Patient will demonstrate improved function as indicated by a functional intake score of more than or equal to 60 out of 100 on FOTO. [x] Progressing  [] Met  [] Not Met     MOBILITY: Patient will improve Range of Motion to stated goals, listed in objective measures above, in order to return to maximal functional potential and improve quality of life. [x] Progressing  [] Met  [] Not Met     STRENGTH: Patient will improve strength to stated goals, listed in objective measures above, in order to improve functional independence and quality of life. [x] Progressing  [] Met  [] Not Met     GAIT: Patient will demonstrate normalized gait mechanics with minimal compensation in order to return to prior level of function. [x] Progressing  [] Met  [] Not Met     Patient will return to normal ADL's, IADL's, community involvement, recreational activities, and work-related activities with less than or equal to 4/10 pain and maximal function.  [x] Progressing  [] Met  [] Not Met          PLAN     Monitor response to today's treatment session and progress with Physical Therapy plan of care as indicated.    Mildred Cheung, PT

## 2024-10-21 ENCOUNTER — CLINICAL SUPPORT (OUTPATIENT)
Dept: REHABILITATION | Facility: HOSPITAL | Age: 64
End: 2024-10-21
Payer: MEDICARE

## 2024-10-21 DIAGNOSIS — M25.672 DECREASED RANGE OF MOTION OF BOTH ANKLES: Primary | ICD-10-CM

## 2024-10-21 DIAGNOSIS — R53.1 DECREASED STRENGTH: ICD-10-CM

## 2024-10-21 DIAGNOSIS — Z74.09 DECREASED FUNCTIONAL MOBILITY AND ENDURANCE: ICD-10-CM

## 2024-10-21 DIAGNOSIS — M25.671 DECREASED RANGE OF MOTION OF BOTH ANKLES: Primary | ICD-10-CM

## 2024-10-21 PROCEDURE — 97530 THERAPEUTIC ACTIVITIES: CPT | Mod: KX

## 2024-10-21 NOTE — PROGRESS NOTES
[facility]   Physical Therapy Treatment Note        Name: Ju Sinclair  [facility] Number: [id number]    Therapy Diagnosis:   Encounter Diagnoses   Name Primary?    Decreased range of motion of both ankles Yes    Decreased strength     Decreased functional mobility and endurance      Physician: Roselyn Sam DPM    Visit Date: 10/21/2024    Physician Orders: Physical Therapy Evaluation and Treat  Medical Diagnosis from Referral:   Diagnosis   M76.61 (ICD-10-CM) - Tendonitis, Achilles, right   M77.51 (ICD-10-CM) - Retrocalcaneal bursitis (back of heel), right   Evaluation Date: 9/23/2024  Authorization Period Expiration: 09/17/2025  Plan of Care Expiration: 12/16/ 2024  Progress Note Due: 10/23/2024  Visit # / Visits authorized: 8/10   FOTO: 1/3      Precautions: Standard and Diabetes; High Blood Pressure; Osteoporosis     Time In: 10:30 AM (late arrival)  Time Out: 11:00 AM   Total Billable Time: 8 minutes  (Billing reflects 1 on 1 treatment time spent with patient)    SUBJECTIVE     Patient reports: she is running late today due to issues with transportation but is ready to be progressed.  She was compliant with home exercise program.  Response to previous treatment: no adverse effects  Functional change: none noted yet    Pain: 5/10  Location:  right heel/achilles > left     OBJECTIVE     Objective Measures updated at progress report unless specified.     TREATMENT       CPT Intervention Performed  10/21/2024  Duration / Intensity     TE  Nu-step  6 minutes level 1 LOWER EXTREMITY only     Recumbent bike x 5 minutes level 1     Standing calf stretch x 2 minutes bilateral slant board               NMR  toe yoga   3 x 10 big toes   3 x 10 little toes      marble pick ups   3 minutes each bilateral (1 round each)      towel scrunches   3 x 10 bilateral     BAPS Board level 2   1 x 20 CW bilateral   1 x 20 CCW bilateral      ankle 4 ways   2 x 10 INVERSION, EVERSION, PLANTAR FLEXION  bilaterally each      LONG ARC QUADs      TA  tandem walking airex x  10 laps forward      single leg balance  3 x 30 seconds each bilateral on airex      standing calf raise  3 x 10 bilateral      standing tibialis anterior raise x  3 x 10 bilateral      sit to stands  2 x 10 with focus on arch support for bilateral feet      eccentric calf raise x 3 x 10 on 4 inch box    Shuttle Squat with focus x 3 minutes 4 bands with focus on ankle DORSIFLEXION       step ups x 2 x 10 bilateral 6 inch box one finger support    MT  talocrural distractions        SOFT TISSUE MOBILIZATION to bilateral gastrocnemius x 8 minutes    PLAN   progress as tolerated         CPT Codes available for Billing:   (00) minutes of Manual therapy (MT) to improve pain and ROM.  (07) minutes of Therapeutic Exercise (TE) to develop strength, endurance, range of motion, and flexibility.  (00) minutes of Neuromuscular Re-Education (NMR)  to improve: Balance, Coordination, Kinesthetic, Sense, Proprioception, and Posture.  (23) minutes of Therapeutic Activities (TA) to improve functional performance.  Unattended Electrical Stimulation (ES) for muscle performance or pain modulation.  BFR: Blood flow restriction applied during exercise  NP or (-): Not Performed    PATIENT EDUCATION AND HOME EXERCISES     Home Exercises Provided and Patient Education Provided     Education provided:   PURPOSE: Patient educated on the impairments noted above and the effects of physical therapy intervention to improve overall condition and QOL.   EXERCISE: Patient was educated on all the above exercise prior/during/after for proper posture, positioning, and execution for safe performance with home exercise program.   STRENGTH: Patient educated on the importance of improved core and extremity strength in order to improve alignment of the spine and extremities with static positions and dynamic movement.     Written Home Exercises Provided: Patient instructed to cont prior HEP.  Exercises were reviewed and Ju was able to demonstrate them prior to the end of the session.  Ju demonstrated good  understanding of the education provided. See EMR under Patient Instructions for exercises provided during therapy sessions      ASSESSMENT   Progressed patient with functional strengthening to which patient tolerated well but still does have difficulty with balance overall bilaterally. No reports of increased heel pain with any new activities added today. Plan to continue progressing as time allows next visit.    Ju Is progressing well towards her goals.   Patient prognosis is Guarded.     Patient will continue to benefit from skilled outpatient physical therapy to address the deficits listed in the problem list box on initial evaluation, provide patient/family education and to maximize patient's level of independence in the home and community environment.     Patient's spiritual, cultural and educational needs considered and patient agreeable to plan of care and goals.     Anticipated barriers to physical therapy: co-morbidities, sedentary lifestyle, chronicity of condition, and adherence to treatment plan     Goals:   Reviewed: 10/21/2024      Short Term Goals:  6 weeks Status  Date Met   PAIN: Patient will report worst pain of 5/10 in order to progress toward max functional ability and improve quality of life. [x] Progressing  [] Met  [] Not Met     FUNCTION: Patient will demonstrate improved function as indicated by a functional intake score of more than or equal to 51 out of 100 on FOTO. [x] Progressing  [] Met  [] Not Met     MOBILITY: Patient will improve Range of Motion to 50% of stated goals, listed in objective measures above, in order to progress towards independence with functional activities.  [x] Progressing  [] Met  [] Not Met     STRENGTH: Patient will improve strength to 50% of stated goals, listed in objective measures above, in order to progress towards independence with  functional activities.  [x] Progressing  [] Met  [] Not Met        Long Term Goals:  12 weeks Status Date Met   PAIN: Patient will report worst pain of 4/10 in order to progress toward max functional ability and improve quality of life [x] Progressing  [] Met  [] Not Met     FUNCTION: Patient will demonstrate improved function as indicated by a functional intake score of more than or equal to 60 out of 100 on FOTO. [x] Progressing  [] Met  [] Not Met     MOBILITY: Patient will improve Range of Motion to stated goals, listed in objective measures above, in order to return to maximal functional potential and improve quality of life. [x] Progressing  [] Met  [] Not Met     STRENGTH: Patient will improve strength to stated goals, listed in objective measures above, in order to improve functional independence and quality of life. [x] Progressing  [] Met  [] Not Met     GAIT: Patient will demonstrate normalized gait mechanics with minimal compensation in order to return to prior level of function. [x] Progressing  [] Met  [] Not Met     Patient will return to normal ADL's, IADL's, community involvement, recreational activities, and work-related activities with less than or equal to 4/10 pain and maximal function.  [x] Progressing  [] Met  [] Not Met          PLAN     Monitor response to today's treatment session and progress with Physical Therapy plan of care as indicated.    Mildred Cheung, PT

## 2024-10-29 ENCOUNTER — CLINICAL SUPPORT (OUTPATIENT)
Dept: REHABILITATION | Facility: HOSPITAL | Age: 64
End: 2024-10-29
Payer: MEDICARE

## 2024-10-29 DIAGNOSIS — M25.671 DECREASED RANGE OF MOTION OF BOTH ANKLES: Primary | ICD-10-CM

## 2024-10-29 DIAGNOSIS — Z74.09 DECREASED FUNCTIONAL MOBILITY AND ENDURANCE: ICD-10-CM

## 2024-10-29 DIAGNOSIS — M25.672 DECREASED RANGE OF MOTION OF BOTH ANKLES: Primary | ICD-10-CM

## 2024-10-29 DIAGNOSIS — R53.1 DECREASED STRENGTH: ICD-10-CM

## 2024-10-29 PROCEDURE — 97530 THERAPEUTIC ACTIVITIES: CPT | Mod: KX

## 2024-10-29 PROCEDURE — 97110 THERAPEUTIC EXERCISES: CPT | Mod: KX

## 2024-11-06 ENCOUNTER — CLINICAL SUPPORT (OUTPATIENT)
Dept: REHABILITATION | Facility: HOSPITAL | Age: 64
End: 2024-11-06
Payer: MEDICARE

## 2024-11-06 DIAGNOSIS — Z74.09 DECREASED FUNCTIONAL MOBILITY AND ENDURANCE: ICD-10-CM

## 2024-11-06 DIAGNOSIS — R53.1 DECREASED STRENGTH: ICD-10-CM

## 2024-11-06 DIAGNOSIS — M25.672 DECREASED RANGE OF MOTION OF BOTH ANKLES: Primary | ICD-10-CM

## 2024-11-06 DIAGNOSIS — M25.671 DECREASED RANGE OF MOTION OF BOTH ANKLES: Primary | ICD-10-CM

## 2024-11-06 PROCEDURE — 97530 THERAPEUTIC ACTIVITIES: CPT | Mod: KX

## 2024-11-06 PROCEDURE — 97112 NEUROMUSCULAR REEDUCATION: CPT | Mod: KX

## 2024-11-06 PROCEDURE — 97110 THERAPEUTIC EXERCISES: CPT | Mod: KX

## 2024-11-06 NOTE — PROGRESS NOTES
[facility]   Physical Therapy Treatment Note      Name: Ju Sinclair  [facility] Number: [id number]    Therapy Diagnosis:   Encounter Diagnoses   Name Primary?    Decreased range of motion of both ankles Yes    Decreased strength     Decreased functional mobility and endurance      Physician: Roselyn Sam DPM    Visit Date: 11/6/2024    Physician Orders: Physical Therapy Evaluation and Treat  Medical Diagnosis from Referral:   Diagnosis   M76.61 (ICD-10-CM) - Tendonitis, Achilles, right   M77.51 (ICD-10-CM) - Retrocalcaneal bursitis (back of heel), right   Evaluation Date: 9/23/2024  Authorization Period Expiration: 09/17/2025  Plan of Care Expiration: 12/16/ 2024  Progress Note Due: 11/28/2024  Visit # / Visits authorized: 10/10   FOTO: 2/3      Precautions: Standard and Diabetes; High Blood Pressure; Osteoporosis     Time In: 10:00 AM   Time Out: 10:45 AM   Total Billable Time: 45 minutes  (Billing reflects 1 on 1 treatment time spent with patient)    SUBJECTIVE     Patient reports: she thinks she has been doing a little better recently but states that she is still having some residual heel pain. Patient states she will return to her podiatrist next week to discuss next steps.  She was compliant with home exercise program.  Response to previous treatment: no adverse effects  Functional change: none noted yet    Pain: 3/10  Location:  right heel/achilles > left     OBJECTIVE     Objective Measures updated at progress report unless specified.       TREATMENT       CPT Intervention Performed  11/6/2024  Duration / Intensity     TE  Nu-step  6 minutes level 1 LOWER EXTREMITY only     Recumbent bike x 5 minutes level 1     Lower Trunk Rotations x 3 minutes     Standing calf stretch  2 minutes bilateral slant board     Objectives Re-assessed   7 minutes         NMR  toe yoga   3 x 10 big toes   3 x 10 little toes      marble pick ups   3 minutes each bilateral (1 round each)       towel scrunches   3 x 10 bilateral     BAPS Board level 2   1 x 20 CW bilateral   1 x 20 CCW bilateral      ankle 4 ways   2 x 10 INVERSION, EVERSION, PLANTAR FLEXION bilaterally each      LONG ARC QUADs x  3 x 10 bilateral    Supine ball squeeze x 3 minutes 3 second holds    Supine clamshell x 3 minutes 3 second holds green band   TA  tandem walking airex   10 laps forward    LONG ARC QUADs x 3 x 10 bilateral      single leg balance  3 x 30 seconds each bilateral on airex      standing calf raise  3 x 10 bilateral      standing tibialis anterior raise x  3 x 10 bilateral      sit to stands  2 x 10 with focus on arch support for bilateral feet      eccentric calf raise x 3 x 10 on 4 inch box    Shuttle Squat with focus  3 minutes 4 bands with focus on ankle DORSIFLEXION       step ups x 2 x 10 bilateral 6 inch box one finger support    Matrix Leg Press  x 3 x 10 65lbs    MT  talocrural distractions        SOFT TISSUE MOBILIZATION to bilateral gastrocnemius x 8 minutes    PLAN   progress as tolerated         CPT Codes available for Billing:   (00) minutes of Manual therapy (MT) to improve pain and ROM.  (15) minutes of Therapeutic Exercise (TE) to develop strength, endurance, range of motion, and flexibility.  (09) minutes of Neuromuscular Re-Education (NMR)  to improve: Balance, Coordination, Kinesthetic, Sense, Proprioception, and Posture.  (21) minutes of Therapeutic Activities (TA) to improve functional performance.  Unattended Electrical Stimulation (ES) for muscle performance or pain modulation.  BFR: Blood flow restriction applied during exercise  NP or (-): Not Performed    PATIENT EDUCATION AND HOME EXERCISES     Home Exercises Provided and Patient Education Provided     Education provided:   PURPOSE: Patient educated on the impairments noted above and the effects of physical therapy intervention to improve overall condition and QOL.   EXERCISE: Patient was educated on all the above exercise  prior/during/after for proper posture, positioning, and execution for safe performance with home exercise program.   STRENGTH: Patient educated on the importance of improved core and extremity strength in order to improve alignment of the spine and extremities with static positions and dynamic movement.     Written Home Exercises Provided: Patient instructed to cont prior HEP. Exercises were reviewed and Ju was able to demonstrate them prior to the end of the session.  Ju demonstrated good  understanding of the education provided. See EMR under Patient Instructions for exercises provided during therapy sessions      ASSESSMENT   Patient tolerated today's session well and was able to progress to increased resistance on leg press and clamshells for continued LOWER EXTREMITY strengthening and lateral glute stability. Patient left session with good fatigue noted.    Ju Is progressing well towards her goals.   Patient prognosis is Guarded.     Patient will continue to benefit from skilled outpatient physical therapy to address the deficits listed in the problem list box on initial evaluation, provide patient/family education and to maximize patient's level of independence in the home and community environment.     Patient's spiritual, cultural and educational needs considered and patient agreeable to plan of care and goals.     Anticipated barriers to physical therapy: co-morbidities, sedentary lifestyle, chronicity of condition, and adherence to treatment plan     Goals:   Reviewed: 11/6/2024      Short Term Goals:  6 weeks Status  Date Met   PAIN: Patient will report worst pain of 5/10 in order to progress toward max functional ability and improve quality of life. [x] Progressing  [] Met  [] Not Met     FUNCTION: Patient will demonstrate improved function as indicated by a functional intake score of more than or equal to 51 out of 100 on FOTO. [x] Progressing  [] Met  [] Not Met     MOBILITY: Patient will  improve Range of Motion to 50% of stated goals, listed in objective measures above, in order to progress towards independence with functional activities.  [] Progressing  [x] Met  [] Not Met  10/29/2024   STRENGTH: Patient will improve strength to 50% of stated goals, listed in objective measures above, in order to progress towards independence with functional activities.  [] Progressing  [x] Met  [] Not Met  10/29/2024      Long Term Goals:  12 weeks Status Date Met   PAIN: Patient will report worst pain of 4/10 in order to progress toward max functional ability and improve quality of life [x] Progressing  [] Met  [] Not Met     FUNCTION: Patient will demonstrate improved function as indicated by a functional intake score of more than or equal to 60 out of 100 on FOTO. [x] Progressing  [] Met  [] Not Met     MOBILITY: Patient will improve Range of Motion to stated goals, listed in objective measures above, in order to return to maximal functional potential and improve quality of life. [x] Progressing  [] Met  [] Not Met     STRENGTH: Patient will improve strength to stated goals, listed in objective measures above, in order to improve functional independence and quality of life. [x] Progressing  [] Met  [] Not Met     GAIT: Patient will demonstrate normalized gait mechanics with minimal compensation in order to return to prior level of function. [x] Progressing  [] Met  [] Not Met     Patient will return to normal ADL's, IADL's, community involvement, recreational activities, and work-related activities with less than or equal to 4/10 pain and maximal function.  [x] Progressing  [] Met  [] Not Met          PLAN     Monitor response to today's treatment session and progress with Physical Therapy plan of care as indicated.    Mildred Cheung, PT

## 2024-11-12 ENCOUNTER — OFFICE VISIT (OUTPATIENT)
Dept: PODIATRY | Facility: CLINIC | Age: 64
End: 2024-11-12
Payer: MEDICARE

## 2024-11-12 VITALS — BODY MASS INDEX: 37.56 KG/M2 | WEIGHT: 233.69 LBS | HEIGHT: 66 IN

## 2024-11-12 DIAGNOSIS — M62.461 GASTROCNEMIUS EQUINUS OF RIGHT LOWER EXTREMITY: ICD-10-CM

## 2024-11-12 DIAGNOSIS — M76.61 TENDONITIS, ACHILLES, RIGHT: ICD-10-CM

## 2024-11-12 DIAGNOSIS — M72.2 PLANTAR FASCIITIS OF RIGHT FOOT: Primary | ICD-10-CM

## 2024-11-12 DIAGNOSIS — I89.0 LYMPHEDEMA: ICD-10-CM

## 2024-11-12 DIAGNOSIS — M77.51 RETROCALCANEAL BURSITIS (BACK OF HEEL), RIGHT: ICD-10-CM

## 2024-11-12 PROCEDURE — 99999 PR PBB SHADOW E&M-EST. PATIENT-LVL III: CPT | Mod: PBBFAC,,, | Performed by: PODIATRIST

## 2024-11-12 RX ORDER — TRIAMCINOLONE ACETONIDE 40 MG/ML
40 INJECTION, SUSPENSION INTRA-ARTICULAR; INTRAMUSCULAR ONCE
Status: COMPLETED | OUTPATIENT
Start: 2024-11-12 | End: 2024-11-12

## 2024-11-12 RX ADMIN — TRIAMCINOLONE ACETONIDE 40 MG: 40 INJECTION, SUSPENSION INTRA-ARTICULAR; INTRAMUSCULAR at 12:11

## 2024-11-12 NOTE — PROGRESS NOTES
Podiatry Department    Patient ID: Ju Sinclair is a 64 y.o. female.    Chief Complaint: Foot Pain (4 week f/u possible injection , pt states she does want the injection , she still is pain and has swelling still, pt is non-diabetic)    History of Present Illness    CHIEF COMPLAINT:  - Ju presents for follow-up of right foot pain, specifically Achilles tendinitis and plantar fasciitis.    HPI:  Ju presents with ongoing issues of Achilles tendinitis and plantar fasciitis. She reports pain in the back of the heel, stating that the entire area is painful. She has undergone icing and rehabilitation, with her last session occurring one day last week. She chose not to continue rehabilitation. Ju reports swelling and has been using compression.    She indicates pain in the foot, particularly at the bottom of the heel, noting that this area is painful. When comparing pain locations, she notes that the back of the heel is not as severe as the bottom. Ju experiences pain constantly, including when first stepping on the ground in the morning. She mentions using a boot for treatment.    Ju denies any specific symptoms.      ROS:  Musculoskeletal: +joint pain, +joint swelling, -muscle pain            Physical Exam    Extremities: Swelling noted to bilateral lower extremities with right worse than left, non-pitting edema.  MSK: Foot/Ankle - Right: Diffuse tenderness plantar medial heel on right foot. Tenderness to palpation of Achilles tendon insertion site on right foot.           Diagnoses:  1. Plantar fasciitis of right foot    2. Retrocalcaneal bursitis (back of heel), right    3. Lymphedema    4. Tendonitis, Achilles, right    5. Gastrocnemius equinus of right lower extremity    Other orders  -     triamcinolone acetonide injection 40 mg        Assessment & Plan    M76.61 Achilles tendinitis, right leg  M72.2 Plantar fascial fibromatosis  M77.31 Calcaneal spur, right foot    LIFESTYLE:  - Recommend  using ice to prevent steroid flare and reduce pain.  - Advised avoiding going barefoot.    MEDICATIONS PRESCRIBED:  - Administered an injection (likely a steroid) to the affected area.  - Suggested putting the patient on an oral pill again.    RETURN TO ACTIVITY:  - Recommend using the boot for the next couple of days, starting today.  - Allow patient to get out of the boot by Monday.    PROCEDURES:    Pt would like to proceed with steroid injection today.  After verbal consent, the right heel was injected from a medial approach with 1 mL of 0.5% marcaine plain and 1 mL of Kenalog 40. Topical ethyl chloride was used for the patient's comfort. The patient tolerated well. A band-aid was applied over the injection site. Discussed steroid flare reaction. Pt advised to ice afterwards.   - Discussed potential surgery for Achilles tendon and plantar fasciitis as a last resort. Explained it would be an outpatient     Future Appointments   Date Time Provider Department Center   11/22/2024  3:00 PM Jay Starr MD [facility]        This note was generated with the assistance of ambient listening technology. Verbal consent was obtained by the patient and accompanying visitor(s) for the recording of patient appointment to facilitate this note. I attest to having reviewed and edited the generated note for accuracy, though some syntax or spelling errors may persist. Please contact the author of this note for any clarification.      Report Electronically Signed By:     Roselyn Sam DPM   Podiatry  [facility]  11/12/2024

## 2024-12-17 ENCOUNTER — DOCUMENTATION ONLY (OUTPATIENT)
Dept: REHABILITATION | Facility: HOSPITAL | Age: 64
End: 2024-12-17
Payer: MEDICARE

## 2024-12-17 NOTE — PROGRESS NOTES
[facility]  Physical Therapy Discharge Note    Name: Ju Sinclair  [facility] Number: [id number]       Therapy Diagnosis:        Encounter Diagnoses   Name Primary?    Decreased range of motion of both ankles Yes    Decreased strength      Decreased functional mobility and endurance        Physician: Roselyn Sam, NE[facility]       Physician Orders: Physical Therapy Evaluation and Treat  Medical Diagnosis from Referral:   Diagnosis   M76.61 (ICD-10-CM) - Tendonitis, Achilles, right   M77.51 (ICD-10-CM) - Retrocalcaneal bursitis (back of heel), right   Evaluation Date: 9/23/2024      Date of Last visit:11/06/2024  Total Visits Received: 10    ASSESSMENT      See daily note    Discharge reason: Patient has not attended therapy since 11/06/2024    Discharge FOTO Score: see daily note    Goals: see daily note    PLAN   This patient is discharged from Physical Therapy      Mildred Cheung PT